# Patient Record
Sex: MALE | Race: WHITE | NOT HISPANIC OR LATINO | Employment: FULL TIME | URBAN - METROPOLITAN AREA
[De-identification: names, ages, dates, MRNs, and addresses within clinical notes are randomized per-mention and may not be internally consistent; named-entity substitution may affect disease eponyms.]

---

## 2021-01-08 ENCOUNTER — OFFICE VISIT (OUTPATIENT)
Dept: URGENT CARE | Facility: CLINIC | Age: 36
End: 2021-01-08
Payer: COMMERCIAL

## 2021-01-08 VITALS
DIASTOLIC BLOOD PRESSURE: 69 MMHG | HEIGHT: 69 IN | SYSTOLIC BLOOD PRESSURE: 136 MMHG | WEIGHT: 213 LBS | RESPIRATION RATE: 20 BRPM | BODY MASS INDEX: 31.55 KG/M2 | HEART RATE: 83 BPM | TEMPERATURE: 97.9 F

## 2021-01-08 DIAGNOSIS — R07.89 RIGHT-SIDED CHEST WALL PAIN: Primary | ICD-10-CM

## 2021-01-08 PROCEDURE — 99213 OFFICE O/P EST LOW 20 MIN: CPT | Performed by: NURSE PRACTITIONER

## 2021-01-09 NOTE — PROGRESS NOTES
[facility] Now        NAME: Qi Romo is a 38 y o  male  : 1985    MRN: [id number]  DATE: 2021  TIME: 7:32 PM    Assessment and Plan   Right-sided chest wall pain [R07 89]  1  Right-sided chest wall pain           Patient Instructions     --Suspect costochondritis (inflammation of cartilage between ribs)  --Advise rest, potentially exacerbating activities such as heavy lifting  --Moist heat  --Motrin/Advil/Aleve or OTC Voltaren gel  An alternative is OTC lidocaine patch    --F/u with PCP if no better/resolved in the next 2 weeks  --Go to ER if pain gets progressively worse and/or you develop shortness of breath    Chief Complaint     Chief Complaint   Patient presents with    Chest Pain     states of having pain in R lower rib area for 2 days, no known injury, worse with inspiration  and with palpation  History of Present Illness         Here with complaints of right sided chest pain x 4 days  Described as "sharp", localized to single area just below nipple  No radiation across chest or elsewhere other than back on occasion  Only felt with deep breath, certain movements  Rates 0/10 at present, 5/10 with deep breath  No associated shortness of breath, cough, fever/chills, rash, N/V, calf pain  No analgesics taken  No ice/heat  No known precipitating injury or activity  Active job, however  Denies PH/FH CAD  Elevated TG only  Review of Systems   Review of Systems   Respiratory: Negative for cough and shortness of breath  Cardiovascular: Positive for chest pain  Negative for palpitations  Gastrointestinal: Negative for nausea and vomiting  Musculoskeletal: Negative for myalgias  Skin: Negative for rash  Psychiatric/Behavioral: Negative for sleep disturbance  Current Medications     No current outpatient medications on file      Current Allergies     Allergies as of 2021    (No Known Allergies)            The following portions of the patient's history were reviewed and updated as appropriate: allergies, current medications, past family history, past medical history, past social history, past surgical history and problem list      No past medical history on file  No past surgical history on file  No family history on file  Medications have been verified  Objective   /69 (Patient Position: Sitting)   Pulse 83   Temp 97 9 °F (36 6 °C) (Temporal)   Resp 20   Ht 5' 9" (1 753 m)   Wt 96 6 kg (213 lb)   BMI 31 45 kg/m²   No LMP for male patient  Physical Exam     Physical Exam  Constitutional:       General: He is not in acute distress  Appearance: He is not ill-appearing, toxic-appearing or diaphoretic  Cardiovascular:      Rate and Rhythm: Normal rate and regular rhythm  Heart sounds: No murmur  Pulmonary:      Effort: Pulmonary effort is normal  No respiratory distress  Breath sounds: Normal breath sounds  No stridor  No wheezing, rhonchi or rales  Comments: Breathing non-labored  RR=16  Area of symptomatology,  localized area of right chest wall approximately 3 cm below nipple, with reproducible tenderness of intercostal space  No palpable or visualized abnormality  Remainder of chest wall nontender with normal appearance  Chest:      Chest wall: Tenderness present  Abdominal:      General: Abdomen is flat  Bowel sounds are normal       Palpations: There is no mass  Tenderness: There is no abdominal tenderness  There is no guarding  Comments: Negative Hernadez  Musculoskeletal:         General: No tenderness  Comments: No calf tenderness  Skin:     Findings: No rash  Neurological:      Mental Status: He is alert     Psychiatric:         Mood and Affect: Mood normal

## 2021-01-09 NOTE — PATIENT INSTRUCTIONS
--Suspect costochondritis (inflammation of cartilage between ribs)  --Advise rest, potentially exacerbating activities such as heavy lifting  --Moist heat  --Motrin/Advil/Aleve or OTC Voltaren gel  An alternative is OTC lidocaine patch    --F/u with PCP if no better/resolved in the next 2 weeks  --Go to ER if pain gets progressively worse and/or you develop shortness of breath    Costochondritis   WHAT YOU NEED TO KNOW:   Costochondritis is a condition that causes pain in the cartilage that connect your ribs to your sternum (breastbone)  Cartilage is the tough, bendable tissue that protects your bones  DISCHARGE INSTRUCTIONS:   Medicines:   · Acetaminophen: This medicine decreases pain  Acetaminophen is available without a doctor's order  Ask how much to take and how often to take it  Follow directions  Acetaminophen can cause liver damage if not taken correctly  · NSAIDs , such as ibuprofen, help decrease swelling, pain, and fever  This medicine is available with or without a doctor's order  NSAIDs can cause stomach bleeding or kidney problems in certain people  If you take blood thinner medicine, always ask if NSAIDs are safe for you  Always read the medicine label and follow directions  Do not give these medicines to children under 10months of age without direction from your child's healthcare provider  · Take your medicine as directed  Contact your healthcare provider if you think your medicine is not helping or if you have side effects  Tell him of her if you are allergic to any medicine  Keep a list of the medicines, vitamins, and herbs you take  Include the amounts, and when and why you take them  Bring the list or the pill bottles to follow-up visits  Carry your medicine list with you in case of an emergency  Follow up with your healthcare provider as directed:  Write down your questions so you remember to ask them during your visits     Rest:  You may need to get more rest  Learn which movements and activities cause pain, and avoid doing them  Do not carry objects, such as a purse or backpack, if this is painful  Avoid activities such as rowing and weightlifting until your pain decreases or goes away  Ask which activities are best for you to do while you recover  Heat:  Heat helps decrease pain in some patients  Apply heat on the area for 20 to 30 minutes every 2 hours for as many days as directed  Ice:  Ice helps decrease swelling and pain  Ice may also help prevent tissue damage  Use an ice pack, or put crushed ice in a plastic bag  Cover it with a towel and place it on the painful area for 15 to 20 minutes every hour or as directed  Stretching exercises:  Gentle stretching may help your symptoms   a doorway and put your hands on the door frame at the level of your ears or shoulders  Take 1 step forward and gently stretch your chest  Try this with your hands higher up on the doorway  Contact your healthcare provider if:   · You have a fever  · The painful areas of your chest look swollen, red, and feel warm to the touch  · You cannot sleep because of the pain  · You have questions or concerns about your condition or care  © Copyright [facility] [address] Information is for End User's use only and may not be sold, redistributed or otherwise used for commercial purposes  All illustrations and images included in CareNotes® are the copyrighted property of [employer]  or Nat Bañuelos  The above information is an  only  It is not intended as medical advice for individual conditions or treatments  Talk to your doctor, nurse or pharmacist before following any medical regimen to see if it is safe and effective for you

## 2021-08-05 ENCOUNTER — OFFICE VISIT (OUTPATIENT)
Dept: URGENT CARE | Facility: CLINIC | Age: 36
End: 2021-08-05
Payer: COMMERCIAL

## 2021-08-05 VITALS
DIASTOLIC BLOOD PRESSURE: 84 MMHG | HEIGHT: 69 IN | OXYGEN SATURATION: 96 % | TEMPERATURE: 96.9 F | RESPIRATION RATE: 16 BRPM | HEART RATE: 91 BPM | SYSTOLIC BLOOD PRESSURE: 144 MMHG | BODY MASS INDEX: 31.4 KG/M2 | WEIGHT: 212 LBS

## 2021-08-05 DIAGNOSIS — B35.4 RINGWORM OF BODY: Primary | ICD-10-CM

## 2021-08-05 PROCEDURE — G0382 LEV 3 HOSP TYPE B ED VISIT: HCPCS | Performed by: PHYSICIAN ASSISTANT

## 2021-08-05 PROCEDURE — S9083 URGENT CARE CENTER GLOBAL: HCPCS | Performed by: PHYSICIAN ASSISTANT

## 2021-08-05 RX ORDER — CLOTRIMAZOLE 1 %
CREAM (GRAM) TOPICAL 2 TIMES DAILY
Qty: 30 G | Refills: 0 | Status: SHIPPED | OUTPATIENT
Start: 2021-08-05

## 2021-08-05 NOTE — PROGRESS NOTES
[facility]        NAME: Wendy Fletcher is a 43 y o  male  : 1985    MRN: [id number]  DATE: 2021  TIME: 5:45 PM    Assessment and Plan   Ringworm of body [B35 4]  1  Ringworm of body  clotrimazole (LOTRIMIN) 1 % cream         Patient Instructions      Begin using prescribed cream to the area   avoid itching as much as possible    Follow-up with primary care physician 3-5 days return for further care if continued symptoms   proceed directly to the ER with any worsening of symptoms  Follow up with PCP in 3-5 days  Proceed to  ER if symptoms worsen  Chief Complaint     Chief Complaint   Patient presents with    Rash     red raised outter area and round pink area inside, area is on neck x this am         History of Present Illness       43year old male  presents the office for chief complaint or red ring on the neck that he was first made aware of today while at the [facility]  Patient has significant tattooing of the neck and arms and did not noticed the rash  Patient was at the [facility] today with the [facility]land mentions something  He does admit to itching at the area for approximately 2 weeks  He denies any drainage, fevers or chills or swelling of the area  Otherwise feels well today  Does have dog at home  no other lesions  Review of Systems   Review of Systems   Constitutional: Negative for chills and fever  HENT: Negative  Respiratory: Negative  Cardiovascular: Negative for chest pain and palpitations  Skin: Positive for color change and rash           Current Medications       Current Outpatient Medications:     clotrimazole (LOTRIMIN) 1 % cream, Apply topically 2 (two) times a day, Disp: 30 g, Rfl: 0    Current Allergies     Allergies as of 2021    (No Known Allergies)            The following portions of the patient's history were reviewed and updated as appropriate: allergies, current medications, past family history, past medical history, past social history, past surgical history and problem list      No past medical history on file  No past surgical history on file  No family history on file  Medications have been verified  Objective   /84   Pulse 91   Temp (!) 96 9 °F (36 1 °C)   Resp 16   Ht 5' 9" (1 753 m)   Wt 96 2 kg (212 lb)   SpO2 96%   BMI 31 31 kg/m²   No LMP for male patient  Physical Exam     Physical Exam  Vitals and nursing note reviewed  Constitutional:       General: He is not in acute distress  Appearance: He is well-developed  He is not ill-appearing or diaphoretic  HENT:      Head: Normocephalic and atraumatic  Right Ear: Hearing and external ear normal       Left Ear: Hearing and external ear normal       Nose: Nose normal  No mucosal edema or rhinorrhea  Mouth/Throat:      Lips: Pink  Mouth: Mucous membranes are moist       Pharynx: Uvula midline  No oropharyngeal exudate, posterior oropharyngeal erythema or uvula swelling  Comments:   No mucous membrane involvement  Eyes:      General: Lids are normal       Conjunctiva/sclera: Conjunctivae normal       Pupils: Pupils are equal, round, and reactive to light  Cardiovascular:      Rate and Rhythm: Normal rate and regular rhythm  Heart sounds: Normal heart sounds, S1 normal and S2 normal  No murmur heard  Pulmonary:      Effort: Pulmonary effort is normal  No respiratory distress  Breath sounds: Normal breath sounds  No stridor  No decreased breath sounds, wheezing or rales  Abdominal:      General: Bowel sounds are normal       Palpations: Abdomen is soft  Tenderness: There is no abdominal tenderness  Musculoskeletal:      Cervical back: Neck supple  Lymphadenopathy:      Cervical: No cervical adenopathy  Skin:     General: Skin is warm and dry  Findings: Rash present  Neurological:      Mental Status: He is alert  Psychiatric:         Behavior: Behavior is cooperative

## 2021-08-05 NOTE — PATIENT INSTRUCTIONS
Begin using prescribed cream to the area   avoid itching as much as possible    Follow-up with primary care physician 3-5 days return for further care if continued symptoms   proceed directly to the ER with any worsening of symptoms      Skin Yeast Infection   WHAT YOU NEED TO KNOW:   Yeast is normally present on the skin  Infection happens when you have too much yeast, or when it gets into a cut on your skin  Certain types of mold and fungus can cause a yeast infection  A skin yeast infection can appear anywhere on your skin or nail beds  Skin yeast infections are usually found on warm, moist parts of the body  Examples include between skin folds or under the breasts  DISCHARGE INSTRUCTIONS:   Return to the emergency department if:   · You have signs of infection, such as pus, warmth or red streaks coming from the wound, or a fever  Call your doctor if:   · Your symptoms worsen or do not get better within 7 to 10 days  · You have new or returning signs of a skin yeast infection after treatment  · You have questions or concerns about your condition or care  Medicines:   · Antifungal medicine  may be given as a cream, ointment, or pill  · Take your medicine as directed  Contact your healthcare provider if you think your medicine is not helping or if you have side effects  Tell him or her if you are allergic to any medicine  Keep a list of the medicines, vitamins, and herbs you take  Include the amounts, and when and why you take them  Bring the list or the pill bottles to follow-up visits  Carry your medicine list with you in case of an emergency  Care for the skin near the infection:  You may only have discolored patches of skin, or areas that are dry and flaking  Care for these skin problems as directed by your healthcare provider  If you have painful skin or an open sore, you will need to protect the skin and prevent damage  You will also need to keep the skin dry as much as possible   Ask your healthcare provider how to care for your skin while the infection clears  The following are general guidelines for caring for painful or open skin:  · Keep the skin clean  Ask your healthcare provider if you should wash with mild soap and water  Do not use soap that contains alcohol  Alcohol can dry and irritate the skin and make symptoms worse  Your baby's healthcare provider may tell you to use diaper cream or ointment when you change his or her diaper  This will protect the skin and prevent moisture from collecting  · Keep the skin dry  Pat the area dry with a towel  Do not rub, because this may irritate the skin  If you have a skin yeast infection between skin folds, lift the top part gently and hold it while you dry between your skin folds  Always dry your feet completely after you swim or bathe, including between your toes  Dry your skin if you are sweating from exercise or exposure to heat  Use a clean towel each time to prevent spreading or continuing the infection  · Keep the skin protected  Ask your healthcare provider if you should cover the area with a bandage or leave it open  Check your skin each day to make sure you do not have new or worsening problems  You may need to have someone check the skin if you cannot see the area easily      Prevent another skin yeast infection:   · Do not share clothing or towels    · Wear shower shoes if you need to use a public shower    · Dry your feet completely after you bathe, and apply antifungal powder or cream as directed    · Put on socks before you get dressed so you do not spread fungus from your feet    · Wear light clothing that allows air to get to your skin    · Manage your weight to prevent skin folds where yeast can collect    · Manage diabetes    · Use antibiotics correctly to prevent antibiotic resistance    · Change your baby's diaper often, and keep the area clean and dry as much as possible    · Use a diaper cream or ointment that contains zinc oxide or dimethicone on your baby's diaper area as directed    Follow up with your doctor as directed:  Write down your questions so you remember to ask them during your visits  © Copyright [employer] 2021 Information is for End User's use only and may not be sold, redistributed or otherwise used for commercial purposes  All illustrations and images included in CareNotes® are the copyrighted property of [employer] M , Inc  or [facility] Shanna Rivero   The above information is an  only  It is not intended as medical advice for individual conditions or treatments  Talk to your doctor, nurse or pharmacist before following any medical regimen to see if it is safe and effective for you

## 2022-07-10 ENCOUNTER — OFFICE VISIT (OUTPATIENT)
Dept: URGENT CARE | Facility: CLINIC | Age: 37
End: 2022-07-10
Payer: COMMERCIAL

## 2022-07-10 VITALS
TEMPERATURE: 97.6 F | SYSTOLIC BLOOD PRESSURE: 116 MMHG | DIASTOLIC BLOOD PRESSURE: 79 MMHG | RESPIRATION RATE: 16 BRPM | HEART RATE: 87 BPM

## 2022-07-10 DIAGNOSIS — L23.4 ALLERGIC CONTACT DERMATITIS DUE TO DYES: Primary | ICD-10-CM

## 2022-07-10 DIAGNOSIS — L03.115 CELLULITIS OF RIGHT LOWER EXTREMITY: ICD-10-CM

## 2022-07-10 PROCEDURE — 99214 OFFICE O/P EST MOD 30 MIN: CPT | Performed by: FAMILY MEDICINE

## 2022-07-10 RX ORDER — PREDNISONE 50 MG/1
50 TABLET ORAL DAILY
Qty: 5 TABLET | Refills: 0 | Status: SHIPPED | OUTPATIENT
Start: 2022-07-10

## 2022-07-10 RX ORDER — CEPHALEXIN 500 MG/1
500 CAPSULE ORAL EVERY 6 HOURS SCHEDULED
Qty: 28 CAPSULE | Refills: 0 | Status: SHIPPED | OUTPATIENT
Start: 2022-07-10 | End: 2022-07-17

## 2022-07-10 NOTE — PROGRESS NOTES
[facility] Now        NAME: Catracho Garland is a 42 y o  male  : 1985    MRN: [id number]  DATE: July 10, 2022  TIME: 11:53 AM    Assessment and Plan   Allergic contact dermatitis due to dyes [L23 4]  1  Allergic contact dermatitis due to dyes  predniSONE 50 mg tablet    cephalexin (KEFLEX) 500 mg capsule   2  Cellulitis of right lower extremity           Patient Instructions     Questionable allergic reaction to white tattoo ink  Leg is swollen, red and streaking  Will treat with oral steroids and Keflex  Follow up with PCP in 3-5 days if no improvement  Proceed to  ER if symptoms worsen  Chief Complaint     Chief Complaint   Patient presents with    Rash     States he had a tatoo placed on R lower leg on   Used a cream which he has used before  Noticed area becoming red and rough and swollen about 5 days ago  History of Present Illness       Allergic Reaction  This is a new problem  The current episode started more than 1 week ago  The problem occurs constantly  The problem has been gradually worsening since onset  Associated with: tattoo ink  Associated symptoms include itching and a rash  Pertinent negatives include no abdominal pain, coughing, eye itching, vomiting or wheezing  Swelling is present on the feet  Past treatments include nothing  There is no history of food allergies  Review of Systems   Review of Systems   Constitutional: Negative for chills and fever  HENT: Negative for congestion and sore throat  Eyes: Negative for discharge and itching  Respiratory: Negative for cough and wheezing  Gastrointestinal: Negative for abdominal pain, nausea and vomiting  Genitourinary: Negative for dysuria and flank pain  Musculoskeletal: Negative for arthralgias, myalgias and neck pain  Skin: Positive for itching and rash  Allergic/Immunologic: Negative for food allergies  Neurological: Negative for light-headedness and headaches     Psychiatric/Behavioral: Negative for agitation  The patient is not nervous/anxious  Current Medications       Current Outpatient Medications:     cephalexin (KEFLEX) 500 mg capsule, Take 1 capsule (500 mg total) by mouth every 6 (six) hours for 7 days, Disp: 28 capsule, Rfl: 0    predniSONE 50 mg tablet, Take 1 tablet (50 mg total) by mouth daily, Disp: 5 tablet, Rfl: 0    clotrimazole (LOTRIMIN) 1 % cream, Apply topically 2 (two) times a day (Patient not taking: Reported on 7/10/2022), Disp: 30 g, Rfl: 0    Current Allergies     Allergies as of 07/10/2022    (No Known Allergies)            The following portions of the patient's history were reviewed and updated as appropriate: allergies, current medications, past family history, past medical history, past social history, past surgical history and problem list      No past medical history on file  No past surgical history on file  No family history on file  Medications have been verified  Objective   /79 (Patient Position: Sitting)   Pulse 87   Temp 97 6 °F (36 4 °C) (Temporal)   Resp 16   No LMP for male patient  Physical Exam     Physical Exam  Vitals reviewed  Constitutional:       General: He is not in acute distress  Appearance: Normal appearance  HENT:      Head: Normocephalic and atraumatic  Eyes:      Extraocular Movements: Extraocular movements intact  Conjunctiva/sclera: Conjunctivae normal    Pulmonary:      Effort: Pulmonary effort is normal  No respiratory distress  Skin:     General: Skin is warm and dry  Findings: Rash (right leg, swelling, erythema and scaling along the medial aspect of right leg) present  Neurological:      General: No focal deficit present  Mental Status: He is alert  Psychiatric:         Mood and Affect: Mood normal          Behavior: Behavior normal          Thought Content:  Thought content normal          Judgment: Judgment normal

## 2024-01-26 ENCOUNTER — OFFICE VISIT (OUTPATIENT)
Dept: URGENT CARE | Facility: CLINIC | Age: 39
End: 2024-01-26
Payer: COMMERCIAL

## 2024-01-26 VITALS
TEMPERATURE: 96.8 F | RESPIRATION RATE: 18 BRPM | SYSTOLIC BLOOD PRESSURE: 145 MMHG | HEART RATE: 115 BPM | HEIGHT: 69 IN | DIASTOLIC BLOOD PRESSURE: 88 MMHG | BODY MASS INDEX: 31.1 KG/M2 | OXYGEN SATURATION: 99 % | WEIGHT: 210 LBS

## 2024-01-26 DIAGNOSIS — R11.0 NAUSEA: Primary | ICD-10-CM

## 2024-01-26 PROCEDURE — 99213 OFFICE O/P EST LOW 20 MIN: CPT | Performed by: NURSE PRACTITIONER

## 2024-01-26 RX ORDER — ONDANSETRON 4 MG/1
4 TABLET, FILM COATED ORAL EVERY 8 HOURS PRN
Qty: 10 TABLET | Refills: 0 | Status: SHIPPED | OUTPATIENT
Start: 2024-01-26

## 2024-01-26 NOTE — PROGRESS NOTES
[facility] Now        NAME: Rj Giraldo is a 38 y.o. male  : 1985    MRN: [id number]  DATE: 2024  TIME: 10:57 AM    Assessment and Plan   Nausea [R11.0]  1. Nausea  ondansetron (ZOFRAN) 4 mg tablet            Patient Instructions       Follow up with PCP in 3-5 days.  Proceed to  ER if symptoms worsen.    Chief Complaint     Chief Complaint   Patient presents with    Fever     Started on wed night, highest fever was 101.           History of Present Illness       Patient is 38 year old male presenting with 2 days of fever, body aches, and nausea. MaxT 101.  Associated with nausea and diarrhea. Denies sore throat, congestion, or ear pain. He took imodium. No other OTC medications.     Fever  Associated symptoms include fatigue, a fever, myalgias and nausea. Pertinent negatives include no abdominal pain, chills, congestion, coughing, headaches, rash, sore throat or vomiting.       Review of Systems   Review of Systems   Constitutional:  Positive for fatigue and fever. Negative for activity change and chills.   HENT:  Negative for congestion, ear pain, rhinorrhea, sinus pain and sore throat.    Respiratory:  Negative for cough.    Gastrointestinal:  Positive for diarrhea and nausea. Negative for abdominal pain and vomiting.   Musculoskeletal:  Positive for myalgias.   Skin:  Negative for rash.   Neurological:  Negative for headaches.         Current Medications       Current Outpatient Medications:     ondansetron (ZOFRAN) 4 mg tablet, Take 1 tablet (4 mg total) by mouth every 8 (eight) hours as needed for nausea or vomiting, Disp: 10 tablet, Rfl: 0    clotrimazole (LOTRIMIN) 1 % cream, Apply topically 2 (two) times a day (Patient not taking: Reported on 7/10/2022), Disp: 30 g, Rfl: 0    predniSONE 50 mg tablet, Take 1 tablet (50 mg total) by mouth daily, Disp: 5 tablet, Rfl: 0    Current Allergies     Allergies as of 2024    (No Known Allergies)            The following portions of  "the patient's history were reviewed and updated as appropriate: allergies, current medications, past family history, past medical history, past social history, past surgical history and problem list.     History reviewed. No pertinent past medical history.    History reviewed. No pertinent surgical history.    History reviewed. No pertinent family history.      Medications have been verified.        Objective   /88   Pulse (!) 115   Temp (!) 96.8 °F (36 °C)   Resp 18   Ht 5' 9\" (1.753 m)   Wt 95.3 kg (210 lb)   SpO2 99%   BMI 31.01 kg/m²        Physical Exam     Physical Exam  Vitals reviewed.   Constitutional:       General: He is awake. He is not in acute distress.     Appearance: Normal appearance. He is normal weight.   HENT:      Head: Normocephalic.      Right Ear: Hearing, tympanic membrane, ear canal and external ear normal.      Left Ear: Hearing, tympanic membrane, ear canal and external ear normal.      Nose: Nose normal.      Mouth/Throat:      Lips: Pink.      Mouth: Mucous membranes are moist.      Pharynx: Oropharynx is clear.   Cardiovascular:      Rate and Rhythm: Regular rhythm. Tachycardia present.      Heart sounds: Normal heart sounds, S1 normal and S2 normal.   Pulmonary:      Effort: Pulmonary effort is normal.      Breath sounds: Normal breath sounds. No decreased breath sounds, wheezing or rhonchi.   Skin:     General: Skin is warm and moist.   Neurological:      General: No focal deficit present.      Mental Status: He is alert and oriented to person, place, and time.   Psychiatric:         Behavior: Behavior is cooperative.                   "

## 2024-05-07 ENCOUNTER — OFFICE VISIT (OUTPATIENT)
Dept: INTERNAL MEDICINE CLINIC | Facility: CLINIC | Age: 39
End: 2024-05-07
Payer: COMMERCIAL

## 2024-05-07 VITALS
DIASTOLIC BLOOD PRESSURE: 84 MMHG | HEIGHT: 69 IN | BODY MASS INDEX: 32.23 KG/M2 | TEMPERATURE: 97.8 F | OXYGEN SATURATION: 96 % | SYSTOLIC BLOOD PRESSURE: 114 MMHG | HEART RATE: 95 BPM | WEIGHT: 217.6 LBS

## 2024-05-07 DIAGNOSIS — K29.60 REFLUX GASTRITIS: ICD-10-CM

## 2024-05-07 DIAGNOSIS — Z00.00 ANNUAL PHYSICAL EXAM: Primary | ICD-10-CM

## 2024-05-07 PROCEDURE — 99203 OFFICE O/P NEW LOW 30 MIN: CPT | Performed by: NURSE PRACTITIONER

## 2024-05-07 PROCEDURE — 99386 PREV VISIT NEW AGE 40-64: CPT | Performed by: NURSE PRACTITIONER

## 2024-05-07 RX ORDER — PANTOPRAZOLE SODIUM 40 MG/1
40 TABLET, DELAYED RELEASE ORAL DAILY
Qty: 30 TABLET | Refills: 0 | Status: SHIPPED | OUTPATIENT
Start: 2024-05-07

## 2024-05-07 NOTE — PROGRESS NOTES
ADULT ANNUAL PHYSICAL  [facility] INTERNAL MEDICINE    NAME: Rj Giraldo  AGE: 38 y.o. SEX: male  : 1985     DATE: 2024     Assessment and Plan:     Problem List Items Addressed This Visit       Reflux gastritis     Discussed a low acid diet.   Advised to take protonix daily for 30 days.   Schedule with GI for EGD.          Relevant Medications    pantoprazole (PROTONIX) 40 mg tablet    Other Relevant Orders    Ambulatory Referral to Gastroenterology     Other Visit Diagnoses       Annual physical exam    -  Primary    Relevant Orders    Comprehensive metabolic panel    CBC and differential    Lipid Panel with Direct LDL reflex    TSH, 3rd generation with Free T4 reflex            Immunizations and preventive care screenings were discussed with patient today. Appropriate education was printed on patient's after visit summary.    Counseling:  Exercise: the importance of regular exercise/physical activity was discussed. Recommend exercise 3-5 times per week for at least 30 minutes.          No follow-ups on file.     Chief Complaint:     Chief Complaint   Patient presents with    SLP Initial Evaluation      History of Present Illness:     Adult Annual Physical   Patient here for a comprehensive physical exam. The patient reports  see below .    Establish care visit.   Ongoing heartburn, all day everyday, some days worse than others.  Taking zantac and tums as needed.   Not worse with certain foods.   Burning up into his throat. No epigastric pain.  Does not smoke, no ETOH use.   Drinks 1 cup of coffee daily.   Does not take nsaids frequently.       Diet and Physical Activity  Diet/Nutrition: well balanced diet.   Exercise: 3-4 times a week on average.      Depression Screening  PHQ-2/9 Depression Screening    Little interest or pleasure in doing things: 0 - not at all  Feeling down, depressed, or hopeless: 0 - not at all  PHQ-2 Score: 0  PHQ-2  Interpretation: Negative depression screen       General Health  Sleep: sleeps well.   Hearing: normal - none .  Vision: goes for regular eye exams and wears glasses.   Dental: regular dental visits.          Review of Systems:     Review of Systems   Constitutional:  Negative for activity change, appetite change, fatigue and unexpected weight change.   Eyes:  Negative for visual disturbance.   Respiratory:  Negative for cough and shortness of breath.    Cardiovascular:  Negative for chest pain, palpitations and leg swelling.   Gastrointestinal:  Negative for abdominal pain, blood in stool, constipation and diarrhea.        Heartburn    Genitourinary:  Negative for difficulty urinating.   Musculoskeletal:  Negative for arthralgias.   Neurological:  Negative for dizziness, weakness, light-headedness and headaches.   Psychiatric/Behavioral:  Negative for sleep disturbance.       Past Medical History:     History reviewed. No pertinent past medical history.   Past Surgical History:     Past Surgical History:   Procedure Laterality Date    HERNIA REPAIR  1985      Family History:     Family History   Problem Relation Age of Onset    Alcohol abuse Neg Hx     Substance Abuse Neg Hx     Mental illness Neg Hx     Depression Neg Hx       Social History:     Social History     Socioeconomic History    Marital status: /Civil Union     Spouse name: None    Number of children: None    Years of education: None    Highest education level: None   Occupational History    None   Tobacco Use    Smoking status: Never    Smokeless tobacco: Never   Vaping Use    Vaping status: Never Used   Substance and Sexual Activity    Alcohol use: Not Currently    Drug use: Never    Sexual activity: Yes     Partners: Female     Birth control/protection: None   Other Topics Concern    None   Social History Narrative    None     Social Determinants of Health     Financial Resource Strain: Not on file   Food Insecurity: Not on file  "  Transportation Needs: Not on file   Physical Activity: Not on file   Stress: Not on file   Social Connections: Not on file   Intimate Partner Violence: Not on file   Housing Stability: Not on file      Current Medications:     Current Outpatient Medications   Medication Sig Dispense Refill    pantoprazole (PROTONIX) 40 mg tablet Take 1 tablet (40 mg total) by mouth daily 30 tablet 0    clotrimazole (LOTRIMIN) 1 % cream Apply topically 2 (two) times a day (Patient not taking: Reported on 7/10/2022) 30 g 0    ondansetron (ZOFRAN) 4 mg tablet Take 1 tablet (4 mg total) by mouth every 8 (eight) hours as needed for nausea or vomiting (Patient not taking: Reported on 5/7/2024) 10 tablet 0    predniSONE 50 mg tablet Take 1 tablet (50 mg total) by mouth daily (Patient not taking: Reported on 5/7/2024) 5 tablet 0     No current facility-administered medications for this visit.      Allergies:     No Known Allergies   Physical Exam:     /84   Pulse 95   Temp 97.8 °F (36.6 °C)   Ht 5' 9\" (1.753 m)   Wt 98.7 kg (217 lb 9.6 oz)   SpO2 96%   BMI 32.13 kg/m²     Physical Exam  Vitals reviewed.   Constitutional:       Appearance: Normal appearance.   HENT:      Head: Normocephalic and atraumatic.      Right Ear: Tympanic membrane, ear canal and external ear normal.      Left Ear: Tympanic membrane, ear canal and external ear normal.   Eyes:      Conjunctiva/sclera: Conjunctivae normal.   Cardiovascular:      Rate and Rhythm: Normal rate and regular rhythm.      Heart sounds: Normal heart sounds.   Pulmonary:      Effort: Pulmonary effort is normal.      Breath sounds: Normal breath sounds.   Abdominal:      General: Bowel sounds are normal.      Palpations: Abdomen is soft.   Musculoskeletal:         General: Normal range of motion.      Cervical back: Neck supple.      Right lower leg: No edema.      Left lower leg: No edema.   Skin:     General: Skin is warm and dry.   Neurological:      Mental Status: He is alert " and oriented to person, place, and time.   Psychiatric:         Mood and Affect: Mood normal.         Behavior: Behavior normal.          BENI Paz  [facility] INTERNAL MEDICINE

## 2024-05-07 NOTE — ASSESSMENT & PLAN NOTE
Discussed a low acid diet.   Advised to take protonix daily for 30 days.   Schedule with GI for EGD.

## 2024-06-03 DIAGNOSIS — K29.60 REFLUX GASTRITIS: ICD-10-CM

## 2024-06-03 RX ORDER — PANTOPRAZOLE SODIUM 40 MG/1
40 TABLET, DELAYED RELEASE ORAL DAILY
Qty: 30 TABLET | Refills: 5 | Status: SHIPPED | OUTPATIENT
Start: 2024-06-03

## 2024-06-07 DIAGNOSIS — K29.60 REFLUX GASTRITIS: ICD-10-CM

## 2024-06-07 RX ORDER — PANTOPRAZOLE SODIUM 40 MG/1
40 TABLET, DELAYED RELEASE ORAL DAILY
Qty: 30 TABLET | Refills: 0 | OUTPATIENT
Start: 2024-06-07

## 2024-08-02 ENCOUNTER — CONSULT (OUTPATIENT)
Dept: GASTROENTEROLOGY | Facility: AMBULARY SURGERY CENTER | Age: 39
End: 2024-08-02
Payer: COMMERCIAL

## 2024-08-02 VITALS
WEIGHT: 217.4 LBS | HEIGHT: 69 IN | SYSTOLIC BLOOD PRESSURE: 122 MMHG | HEART RATE: 91 BPM | BODY MASS INDEX: 32.2 KG/M2 | OXYGEN SATURATION: 99 % | DIASTOLIC BLOOD PRESSURE: 74 MMHG

## 2024-08-02 DIAGNOSIS — E83.119 HEMOCHROMATOSIS, UNSPECIFIED HEMOCHROMATOSIS TYPE: Primary | ICD-10-CM

## 2024-08-02 DIAGNOSIS — K21.9 GASTROESOPHAGEAL REFLUX DISEASE, UNSPECIFIED WHETHER ESOPHAGITIS PRESENT: ICD-10-CM

## 2024-08-02 DIAGNOSIS — R13.10 DYSPHAGIA, UNSPECIFIED TYPE: ICD-10-CM

## 2024-08-02 DIAGNOSIS — K29.60 REFLUX GASTRITIS: ICD-10-CM

## 2024-08-02 PROBLEM — E83.118 OTHER HEMOCHROMATOSIS: Status: ACTIVE | Noted: 2024-08-02

## 2024-08-02 PROCEDURE — 99204 OFFICE O/P NEW MOD 45 MIN: CPT | Performed by: PHYSICIAN ASSISTANT

## 2024-08-02 NOTE — PROGRESS NOTES
[facility] - Outpatient Consultation  Rj Giraldo 39 y.o. male MRN: [id number]  Encounter: [id number]          ASSESSMENT AND PLAN:      39-year-old male with no significant past medical history who presents the office as a new patient for reflux.    Chronic GERD  Dysphagia  Longstanding history of symptoms for over 5 years.  Taking multiple Tums and Zantac daily.  Recently started on Protonix by PCP with some improvement of symptoms.  Dysphagia to solids and liquids mainly in the upper esophagus.  Rule out underlying EOE, severe reflux esophagitis, Schatzki's ring.    -Schedule EGD.  -I obtained informed consent from the patient. The risks/benefits/alternatives of the procedure were discussed with the patient. Risks included, but not limited to, infection, bleeding, perforation. Patient was agreeable.    -Continue Protonix.  Start taking this 30 minutes prior to first meal of the day.  - Can continue Zantac/Pepcid 1-2 times a day as needed.  - Recommend avoiding known food triggers such as based food, fatty food, tomato products, carbonation, citrus.  -Patient is non-smoker.  No alcohol use.    -Further recommendations based on EGD results.    3. ?  Hemochromatosis  Patient reports being told he had this by a sports medicine/possible functional doctor.  He was told to donate blood every 8 weeks which he has been doing for about a year.  He denies ever being turned away.  He has no lab work available in the chart.  No known family history of this.    -Recommend starting with basic labs to check CBC and iron panel.  -Will hold off on gene testing and await CBC and iron panel first.  - Recommend referral to hematology  -Advised patient based on labs will likely plan for abdominal imaging.  Will await for hematology referral  -Discussed hemochromatosis and risk of development to cirrhosis, liver cancer and requires monitoring.      Patient was recommended to follow up after procedure.  Patient was recommended to reach out via [employer] with any questions or concerns in the meantime.   ______________________________________________________________________    HPI:      Rj Giraldo is a 39 y.o. male with no significant past medical history who presents the office as a new patient for reflux.    Patient reports longstanding history of reflux for at least over 5 years.  He reports he would take multiple, daily Tums to control this.  He will try over-the-counter courses of Zantac and Prilosec as well.  His symptoms are mainly with burning in the chest and cough.  He also reports dysphagia with food feeling stuck in the upper esophagus with regurgitation about 1 time a week.    He followed up with his PCP for symptoms 2 months ago and was started on Protonix course.  He does report this seems to be helping and he does not need to use as much Tums.  He is taking this typically after his first meal.    He also does report he was diagnosed with hemochromatosis by a sports medicine provider.  He reports he was told to donate blood every 8 weeks.  He has been doing this for a year now.  He denies any true formal testing.  He has no lab work available in the chart.  He denies any known family history of this.    Patient denies smoking or alcohol use.    No known significant GI family history.    No prior EGD or colonoscopy.    REVIEW OF SYSTEMS:    CONSTITUTIONAL: Denies any fever, chills, rigors, and weight loss.  HEENT: No earache or tinnitus. Denies hearing loss or visual disturbances.  CARDIOVASCULAR: No chest pain or palpitations.   RESPIRATORY: Denies any cough, hemoptysis, shortness of breath or dyspnea on exertion.  GASTROINTESTINAL: As noted in the History of Present Illness.   GENITOURINARY: No problems with urination. Denies any hematuria or dysuria.  NEUROLOGIC: No dizziness or vertigo, denies headaches.   MUSCULOSKELETAL: Denies any muscle or joint pain.   SKIN: Denies skin rashes or itching.  "  ENDOCRINE: Denies excessive thirst. Denies intolerance to heat or cold.  PSYCHOSOCIAL: Denies depression or anxiety. Denies any recent memory loss.       Historical Information   History reviewed. No pertinent past medical history.  Past Surgical History:   Procedure Laterality Date    HERNIA REPAIR  1985     Social History   Social History     Substance and Sexual Activity   Alcohol Use Not Currently     Social History     Substance and Sexual Activity   Drug Use Never     Social History     Tobacco Use   Smoking Status Never   Smokeless Tobacco Never     Family History   Problem Relation Age of Onset    Alcohol abuse Neg Hx     Substance Abuse Neg Hx     Mental illness Neg Hx     Depression Neg Hx        Meds/Allergies       Current Outpatient Medications:     pantoprazole (PROTONIX) 40 mg tablet    clotrimazole (LOTRIMIN) 1 % cream    No Known Allergies        Objective     Blood pressure 122/74, pulse 91, height 5' 9\" (1.753 m), weight 98.6 kg (217 lb 6.4 oz), SpO2 99%. Body mass index is 32.1 kg/m².        PHYSICAL EXAM:      General Appearance:   Alert, cooperative, no distress   HEENT:   Normocephalic, atraumatic, anicteric.     Neck:  Supple, symmetrical, trachea midline   Lungs:   Clear to auscultation bilaterally; no rales, rhonchi or wheezing; respirations unlabored    Heart::   Regular rate and rhythm; no murmur, rub, or gallop.   Abdomen:   Soft, non-tender, non-distended; normal bowel sounds; no masses, no organomegaly. Benign abdomen.    Genitalia:   Deferred    Rectal:   Deferred    Extremities:  No cyanosis, clubbing or edema    Pulses:  2+ and symmetric    Skin:  No jaundice, rashes, or lesions    Lymph nodes:  No palpable cervical lymphadenopathy        Lab Results:   No visits with results within 1 Day(s) from this visit.   Latest known visit with results is:   Generic Conversion - Encounter on 10/26/2015   Component Date Value    Cholesterol 10/26/2015 193     Triglycerides 10/26/2015 141     " HDL 10/26/2015 53     VLDL Cholesterol Yousuf 10/26/2015 28     LDL Calculated 10/26/2015 112 (H)          Radiology Results:   No results found.

## 2024-08-02 NOTE — PATIENT INSTRUCTIONS
Scheduled date of EGD(as of today): Sept. 6, 2024   Physician performing EGD: Dr. Lawton   Location of EGD: [facility]   Instructions reviewed with patient by: JOSÉ MIGUEL   Clearances: N/A

## 2024-08-20 ENCOUNTER — APPOINTMENT (OUTPATIENT)
Dept: LAB | Facility: AMBULARY SURGERY CENTER | Age: 39
End: 2024-08-20
Payer: COMMERCIAL

## 2024-08-20 DIAGNOSIS — E83.119 HEMOCHROMATOSIS, UNSPECIFIED HEMOCHROMATOSIS TYPE: ICD-10-CM

## 2024-08-20 DIAGNOSIS — Z00.00 ANNUAL PHYSICAL EXAM: ICD-10-CM

## 2024-08-20 LAB
ALBUMIN SERPL BCG-MCNC: 4.4 G/DL (ref 3.5–5)
ALP SERPL-CCNC: 45 U/L (ref 34–104)
ALT SERPL W P-5'-P-CCNC: 67 U/L (ref 7–52)
ANION GAP SERPL CALCULATED.3IONS-SCNC: 10 MMOL/L (ref 4–13)
AST SERPL W P-5'-P-CCNC: 37 U/L (ref 13–39)
BASOPHILS # BLD AUTO: 0.04 THOUSANDS/ÂΜL (ref 0–0.1)
BASOPHILS NFR BLD AUTO: 1 % (ref 0–1)
BILIRUB SERPL-MCNC: 0.54 MG/DL (ref 0.2–1)
BUN SERPL-MCNC: 12 MG/DL (ref 5–25)
CALCIUM SERPL-MCNC: 9.3 MG/DL (ref 8.4–10.2)
CHLORIDE SERPL-SCNC: 103 MMOL/L (ref 96–108)
CHOLEST SERPL-MCNC: 192 MG/DL
CO2 SERPL-SCNC: 25 MMOL/L (ref 21–32)
CREAT SERPL-MCNC: 1.24 MG/DL (ref 0.6–1.3)
EOSINOPHIL # BLD AUTO: 0.1 THOUSAND/ÂΜL (ref 0–0.61)
EOSINOPHIL NFR BLD AUTO: 2 % (ref 0–6)
ERYTHROCYTE [DISTWIDTH] IN BLOOD BY AUTOMATED COUNT: 14.1 % (ref 11.6–15.1)
FERRITIN SERPL-MCNC: 8 NG/ML (ref 24–336)
GFR SERPL CREATININE-BSD FRML MDRD: 71 ML/MIN/1.73SQ M
GLUCOSE P FAST SERPL-MCNC: 90 MG/DL (ref 65–99)
HCT VFR BLD AUTO: 47.2 % (ref 36.5–49.3)
HDLC SERPL-MCNC: 40 MG/DL
HGB BLD-MCNC: 14.9 G/DL (ref 12–17)
IMM GRANULOCYTES # BLD AUTO: 0.02 THOUSAND/UL (ref 0–0.2)
IMM GRANULOCYTES NFR BLD AUTO: 0 % (ref 0–2)
IRON SATN MFR SERPL: 14 % (ref 15–50)
IRON SERPL-MCNC: 57 UG/DL (ref 50–212)
LDLC SERPL CALC-MCNC: 125 MG/DL (ref 0–100)
LYMPHOCYTES # BLD AUTO: 1.47 THOUSANDS/ÂΜL (ref 0.6–4.47)
LYMPHOCYTES NFR BLD AUTO: 26 % (ref 14–44)
MCH RBC QN AUTO: 25.3 PG (ref 26.8–34.3)
MCHC RBC AUTO-ENTMCNC: 31.6 G/DL (ref 31.4–37.4)
MCV RBC AUTO: 80 FL (ref 82–98)
MONOCYTES # BLD AUTO: 0.48 THOUSAND/ÂΜL (ref 0.17–1.22)
MONOCYTES NFR BLD AUTO: 9 % (ref 4–12)
NEUTROPHILS # BLD AUTO: 3.48 THOUSANDS/ÂΜL (ref 1.85–7.62)
NEUTS SEG NFR BLD AUTO: 62 % (ref 43–75)
NRBC BLD AUTO-RTO: 0 /100 WBCS
PLATELET # BLD AUTO: 286 THOUSANDS/UL (ref 149–390)
PMV BLD AUTO: 8.8 FL (ref 8.9–12.7)
POTASSIUM SERPL-SCNC: 4.3 MMOL/L (ref 3.5–5.3)
PROT SERPL-MCNC: 6.6 G/DL (ref 6.4–8.4)
RBC # BLD AUTO: 5.9 MILLION/UL (ref 3.88–5.62)
SODIUM SERPL-SCNC: 138 MMOL/L (ref 135–147)
TIBC SERPL-MCNC: 400 UG/DL (ref 250–450)
TRIGL SERPL-MCNC: 137 MG/DL
TSH SERPL DL<=0.05 MIU/L-ACNC: 1.69 UIU/ML (ref 0.45–4.5)
UIBC SERPL-MCNC: 343 UG/DL (ref 155–355)
WBC # BLD AUTO: 5.59 THOUSAND/UL (ref 4.31–10.16)

## 2024-08-20 PROCEDURE — 83540 ASSAY OF IRON: CPT

## 2024-08-20 PROCEDURE — 85025 COMPLETE CBC W/AUTO DIFF WBC: CPT

## 2024-08-20 PROCEDURE — 36415 COLL VENOUS BLD VENIPUNCTURE: CPT

## 2024-08-20 PROCEDURE — 84443 ASSAY THYROID STIM HORMONE: CPT

## 2024-08-20 PROCEDURE — 80053 COMPREHEN METABOLIC PANEL: CPT

## 2024-08-20 PROCEDURE — 83550 IRON BINDING TEST: CPT

## 2024-08-20 PROCEDURE — 82728 ASSAY OF FERRITIN: CPT

## 2024-08-20 PROCEDURE — 80061 LIPID PANEL: CPT

## 2024-08-21 DIAGNOSIS — R74.8 ELEVATED LIVER ENZYMES: Primary | ICD-10-CM

## 2024-08-23 ENCOUNTER — ANESTHESIA EVENT (OUTPATIENT)
Dept: ANESTHESIOLOGY | Facility: HOSPITAL | Age: 39
End: 2024-08-23

## 2024-08-23 ENCOUNTER — ANESTHESIA (OUTPATIENT)
Dept: ANESTHESIOLOGY | Facility: HOSPITAL | Age: 39
End: 2024-08-23

## 2024-08-24 DIAGNOSIS — R74.8 ELEVATED LIVER ENZYMES: Primary | ICD-10-CM

## 2024-09-06 ENCOUNTER — HOSPITAL ENCOUNTER (OUTPATIENT)
Dept: GASTROENTEROLOGY | Facility: AMBULARY SURGERY CENTER | Age: 39
Setting detail: OUTPATIENT SURGERY
End: 2024-09-06
Payer: COMMERCIAL

## 2024-09-06 ENCOUNTER — ANESTHESIA EVENT (OUTPATIENT)
Dept: GASTROENTEROLOGY | Facility: AMBULARY SURGERY CENTER | Age: 39
End: 2024-09-06
Payer: COMMERCIAL

## 2024-09-06 VITALS
SYSTOLIC BLOOD PRESSURE: 125 MMHG | HEIGHT: 69 IN | BODY MASS INDEX: 32.58 KG/M2 | WEIGHT: 220 LBS | HEART RATE: 91 BPM | OXYGEN SATURATION: 98 % | RESPIRATION RATE: 22 BRPM | DIASTOLIC BLOOD PRESSURE: 72 MMHG | TEMPERATURE: 97.3 F

## 2024-09-06 DIAGNOSIS — K21.9 GASTROESOPHAGEAL REFLUX DISEASE, UNSPECIFIED WHETHER ESOPHAGITIS PRESENT: ICD-10-CM

## 2024-09-06 DIAGNOSIS — R13.10 DYSPHAGIA, UNSPECIFIED TYPE: ICD-10-CM

## 2024-09-06 PROCEDURE — 88305 TISSUE EXAM BY PATHOLOGIST: CPT | Performed by: PATHOLOGY

## 2024-09-06 PROCEDURE — 43239 EGD BIOPSY SINGLE/MULTIPLE: CPT | Performed by: INTERNAL MEDICINE

## 2024-09-06 RX ORDER — GLYCOPYRROLATE 0.2 MG/ML
INJECTION INTRAMUSCULAR; INTRAVENOUS AS NEEDED
Status: DISCONTINUED | OUTPATIENT
Start: 2024-09-06 | End: 2024-09-06

## 2024-09-06 RX ORDER — PROPOFOL 10 MG/ML
INJECTION, EMULSION INTRAVENOUS AS NEEDED
Status: DISCONTINUED | OUTPATIENT
Start: 2024-09-06 | End: 2024-09-06

## 2024-09-06 RX ORDER — SODIUM CHLORIDE, SODIUM LACTATE, POTASSIUM CHLORIDE, CALCIUM CHLORIDE 600; 310; 30; 20 MG/100ML; MG/100ML; MG/100ML; MG/100ML
INJECTION, SOLUTION INTRAVENOUS CONTINUOUS PRN
Status: DISCONTINUED | OUTPATIENT
Start: 2024-09-06 | End: 2024-09-06

## 2024-09-06 RX ORDER — LIDOCAINE HYDROCHLORIDE 10 MG/ML
INJECTION, SOLUTION EPIDURAL; INFILTRATION; INTRACAUDAL; PERINEURAL AS NEEDED
Status: DISCONTINUED | OUTPATIENT
Start: 2024-09-06 | End: 2024-09-06

## 2024-09-06 RX ADMIN — SODIUM CHLORIDE, SODIUM LACTATE, POTASSIUM CHLORIDE, AND CALCIUM CHLORIDE: .6; .31; .03; .02 INJECTION, SOLUTION INTRAVENOUS at 11:29

## 2024-09-06 RX ADMIN — LIDOCAINE HYDROCHLORIDE 50 MG: 10 INJECTION, SOLUTION EPIDURAL; INFILTRATION; INTRACAUDAL at 11:38

## 2024-09-06 RX ADMIN — PROPOFOL 150 MG: 10 INJECTION, EMULSION INTRAVENOUS at 11:38

## 2024-09-06 RX ADMIN — PROPOFOL 50 MG: 10 INJECTION, EMULSION INTRAVENOUS at 11:39

## 2024-09-06 RX ADMIN — GLYCOPYRROLATE 0.2 MG: 0.2 INJECTION INTRAMUSCULAR; INTRAVENOUS at 11:36

## 2024-09-06 RX ADMIN — PROPOFOL 50 MG: 10 INJECTION, EMULSION INTRAVENOUS at 11:40

## 2024-09-06 NOTE — H&P
"History and Physical -  [facility]  Rj Giraldo 39 y.o. male MRN: [id number]        HPI: 39-year-old male with history of GERD reports having difficulty swallowing.    Historical Information   Past Medical History:   Diagnosis Date    GERD (gastroesophageal reflux disease)      Past Surgical History:   Procedure Laterality Date    HERNIA REPAIR  1985     Social History   Social History     Substance and Sexual Activity   Alcohol Use Yes    Comment: social     Social History     Substance and Sexual Activity   Drug Use Never     Social History     Tobacco Use   Smoking Status Never   Smokeless Tobacco Never     Family History   Problem Relation Age of Onset    Alcohol abuse Neg Hx     Substance Abuse Neg Hx     Mental illness Neg Hx     Depression Neg Hx        Meds/Allergies     Not in a hospital admission.    No Known Allergies    Objective     Blood pressure 132/82, pulse 90, temperature (!) 97.1 °F (36.2 °C), temperature source Temporal, resp. rate 18, height 5' 9\" (1.753 m), weight 99.8 kg (220 lb), SpO2 96%.    Physical Exam:    Chest- CTA  Heart- RRR  Abdomen- NT/ND  Extremities- No edema    ASSESSMENT:     GERD, dysphagia    PLAN:    EGD              "

## 2024-09-06 NOTE — ANESTHESIA PREPROCEDURE EVALUATION
Procedure:  EGD    Relevant Problems   ANESTHESIA (within normal limits)      CARDIO (within normal limits)      ENDO (within normal limits)      GI/HEPATIC   (+) Gastroesophageal reflux disease      /RENAL (within normal limits)      HEMATOLOGY (within normal limits)      MUSCULOSKELETAL (within normal limits)      NEURO/PSYCH (within normal limits)      PULMONARY (within normal limits)        Physical Exam    Airway    Mallampati score: II  TM Distance: >3 FB  Neck ROM: full     Dental   No notable dental hx     Cardiovascular  Rhythm: regular, Rate: normal, Cardiovascular exam normal    Pulmonary  Pulmonary exam normal Breath sounds clear to auscultation    Other Findings  post-pubertal.      Anesthesia Plan  ASA Score- 2     Anesthesia Type- IV sedation with anesthesia with ASA Monitors.         Additional Monitors:     Airway Plan:            Plan Factors-Exercise tolerance (METS): >4 METS.    Chart reviewed.   Existing labs reviewed. Patient summary reviewed.    Patient is not a current smoker.  Patient instructed to abstain from smoking on day of procedure. Patient did not smoke on day of surgery.            Induction- intravenous.    Postoperative Plan-     Perioperative Resuscitation Plan - Level 1 - Full Code.       Informed Consent- Anesthetic plan and risks discussed with patient.  I personally reviewed this patient with the CRNA. Discussed and agreed on the Anesthesia Plan with the CRNA..

## 2024-09-06 NOTE — ANESTHESIA POSTPROCEDURE EVALUATION
Post-Op Assessment Note    CV Status:  Stable  Pain Score: 0    Pain management: adequate       Mental Status:  Alert and awake   Hydration Status:  Euvolemic   PONV Controlled:  Controlled   Airway Patency:  Patent     Post Op Vitals Reviewed: Yes    No anethesia notable event occurred.    Staff: CRNA               BP   124/76   Temp      Pulse  99   Resp   14   SpO2   99

## 2024-09-07 NOTE — PROGRESS NOTES
HEMATOLOGY / ONCOLOGY CLINIC FOLLOW UP NOTE    Patient Rj Giraldo  MRN: [id number]  : 1985  Date of Encounter 2024      Referring Provider: Marielena Velasquez    Reason for Encounter: Consult for unclear hx hemachromatosis        Oncology History    No history exists.     Assessment / Plan:      Chronic GERD/ Dysphagia  Longstanding history of symptoms for over 5 years.  Taking multiple Tums and Zantac daily.  Recently started on Protonix by PCP with some improvement of symptoms.  Dysphagia to solids and liquids mainly in the upper esophagus.  Rule out underlying EOE, severe reflux esophagitis, Schatzki's ring.     -Schedule EGD.  -Continue Protonix.  Start taking this 30 minutes prior to first meal of the day.  - Can continue Zantac/Pepcid 1-2 times a day as needed.  - Recommend avoiding known food triggers such as based food, fatty food, tomato products, carbonation, citrus.  -Patient is non-smoker.  No alcohol use.     -Further recommendations based on EGD results.     Has questionable history of Hemochromatosis    Sports medicine told him he had this disease and to have phlebotomy with no evidence    Ferritin is very low at 5 with % sat 14 total iron 57 TIBC  400 TSAT low at 14.5%   Hgb 14.9 MCV 80 and thus appears borderline iron deficient    Normal LFTs    TSH/testosterone not done by GI     Will image liver but does not appear c/w hemachromatosis           HPI         Rj Giraldo is a 39 y.o. male with no significant past medical history who presents the office as a new patient for reflux.     Patient reports longstanding history of reflux for at least over 5 years.  He reports he would take multiple, daily Tums to control this.  He will try over-the-counter courses of Zantac and Prilosec as well.  His symptoms are mainly with burning in the chest and cough.  He also reports dysphagia with food feeling stuck in the upper esophagus with regurgitation about 1 time a week.     He followed up  with his PCP for symptoms 2 months ago and was started on Protonix course.  He does report this seems to be helping and he does not need to use as much Tums.  He is taking this typically after his first meal.     He also does report he was diagnosed with hemochromatosis by a sports medicine provider.  He reports he was told to donate blood every 8 weeks.  He has been doing this for a year now.  He denies any true formal testing.  He has no lab work available in the chart.  He denies any known family history of this.     Patient denies smoking or alcohol use.     No known significant GI family history.     No prior EGD or colonoscopy.        History:  Here for diagnosis of hemachromatosis with a ferritin of 8.   Very unclear where this diagnosis came from           Latest Reference Range & Units 08/20/24 08:28   Sodium 135 - 147 mmol/L 138   Potassium 3.5 - 5.3 mmol/L 4.3   Chloride 96 - 108 mmol/L 103   Carbon Dioxide 21 - 32 mmol/L 25   ANION GAP 4 - 13 mmol/L 10   BUN 5 - 25 mg/dL 12   Creatinine 0.60 - 1.30 mg/dL 1.24   GLUCOSE, FASTING 65 - 99 mg/dL 90   Calcium 8.4 - 10.2 mg/dL 9.3   AST 13 - 39 U/L 37   ALT 7 - 52 U/L 67 (H)   ALK PHOS 34 - 104 U/L 45   Total Protein 6.4 - 8.4 g/dL 6.6   Albumin 3.5 - 5.0 g/dL 4.4   Total Bilirubin 0.20 - 1.00 mg/dL 0.54   GFR, Calculated ml/min/1.73sq m 71   Cholesterol See Comment mg/dL 192   Triglycerides See Comment mg/dL 137   HDL >=40 mg/dL 40   LDL Calculated 0 - 100 mg/dL 125 (H)   Iron 50 - 212 ug/dL 57   FERRITIN 24 - 336 ng/mL 8 (L)   Iron Saturation 15 - 50 % 14 (L)   TIBC 250 - 450 ug/dL 400   UIBC 155 - 355 ug/dL 343   WBC 4.31 - 10.16 Thousand/uL 5.59   RBC 3.88 - 5.62 Million/uL 5.90 (H)   Hemoglobin 12.0 - 17.0 g/dL 14.9   Hematocrit 36.5 - 49.3 % 47.2   MCV 82 - 98 fL 80 (L)   MCH 26.8 - 34.3 pg 25.3 (L)   MCHC 31.4 - 37.4 g/dL 31.6   RDW 11.6 - 15.1 % 14.1   Platelet Count 149 - 390 Thousands/uL 286   MPV 8.9 - 12.7 fL 8.8 (L)   nRBC /100 WBCs 0   Segmented  % 43 - 75 % 62   Lymphocytes % 14 - 44 % 26   Monocytes % 4 - 12 % 9   Eosinophils % 0 - 6 % 2   Basophils % 0 - 1 % 1   Immature Grans % 0 - 2 % 0   Absolute Immature Grans 0.00 - 0.20 Thousand/uL 0.02   Absolute Neutrophils 1.85 - 7.62 Thousands/µL 3.48   Absolute Lymphocytes 0.60 - 4.47 Thousands/µL 1.47   Absolute Monocytes 0.17 - 1.22 Thousand/µL 0.48   Absolute Eosinophils 0.00 - 0.61 Thousand/µL 0.10   Absolute Basophils 0.00 - 0.10 Thousands/µL 0.04   (H): Data is abnormally high  (L): Data is abnormally low      REVIEW OF SYSTEMS:  Please note that a 14-point review of systems was performed to include Constitutional, HEENT, Respiratory, CVS, GI, , Musculoskeletal, Integumentary, Neurologic, Rheumatologic, Endocrinologic, Psychiatric, Lymphatic, and Hematologic/Oncologic systems were reviewed and are negative unless otherwise stated in HPI. Positive and negative findings pertinent to this evaluation are incorporated into the history of present illness.      ECOG PS: ***    PROBLEM LIST:  Patient Active Problem List   Diagnosis    Reflux gastritis    Other hemochromatosis    Gastroesophageal reflux disease       Past Medical History:   has a past medical history of GERD (gastroesophageal reflux disease).    PAST SURGICAL HISTORY:   has a past surgical history that includes Hernia repair (1985).    CURRENT MEDICATIONS  Current Outpatient Medications   Medication Sig Dispense Refill    clotrimazole (LOTRIMIN) 1 % cream Apply topically 2 (two) times a day (Patient not taking: Reported on 7/10/2022) 30 g 0    pantoprazole (PROTONIX) 40 mg tablet TAKE 1 TABLET BY MOUTH EVERY DAY 30 tablet 5     No current facility-administered medications for this visit.     [unfilled]    SOCIAL HISTORY:   reports that he has never smoked. He has never used smokeless tobacco. He reports current alcohol use. He reports that he does not use drugs.     FAMILY HISTORY:  family history is not on file.     ALLERGIES:  has No Known  Allergies.      Physical Exam:  Vital Signs:   Visit Vitals  Smoking Status Never     There is no height or weight on file to calculate BMI.  There is no height or weight on file to calculate BSA.    GEN: Alert, awake oriented x3, in no acute distress  HEENT- No pallor, icterus, cyanosis, no oral mucosal lesions,   LAD - no palpable cervical, clavicle, axillary, inguinal LAD  Heart- normal S1 S2, regular rate and rhythm, No murmur, rubs.   Lungs- clear breathing sound bilateral.   Abdomen- soft, Non tender, bowel sounds present  Extremities- No cyanosis, clubbing, edema  Neuro- No focal neurological deficit    Labs:  Lab Results   Component Value Date    WBC 5.59 08/20/2024    HGB 14.9 08/20/2024    HCT 47.2 08/20/2024    MCV 80 (L) 08/20/2024     08/20/2024     Lab Results   Component Value Date    SODIUM 138 08/20/2024    K 4.3 08/20/2024     08/20/2024    CO2 25 08/20/2024    AGAP 10 08/20/2024    BUN 12 08/20/2024    CREATININE 1.24 08/20/2024    GLUF 90 08/20/2024    CALCIUM 9.3 08/20/2024    AST 37 08/20/2024    ALT 67 (H) 08/20/2024    ALKPHOS 45 08/20/2024    TP 6.6 08/20/2024    TBILI 0.54 08/20/2024    EGFR 71 08/20/2024           I spent *** minutes on chart review, face to face counseling time, coordination of care and documentation.    Anna Thomas MD PhD

## 2024-09-09 ENCOUNTER — CONSULT (OUTPATIENT)
Dept: HEMATOLOGY ONCOLOGY | Facility: CLINIC | Age: 39
End: 2024-09-09
Payer: COMMERCIAL

## 2024-09-09 ENCOUNTER — TELEPHONE (OUTPATIENT)
Dept: HEMATOLOGY ONCOLOGY | Facility: CLINIC | Age: 39
End: 2024-09-09

## 2024-09-09 VITALS
WEIGHT: 222.5 LBS | OXYGEN SATURATION: 97 % | DIASTOLIC BLOOD PRESSURE: 66 MMHG | HEART RATE: 90 BPM | RESPIRATION RATE: 18 BRPM | SYSTOLIC BLOOD PRESSURE: 118 MMHG | HEIGHT: 69 IN | BODY MASS INDEX: 32.95 KG/M2 | TEMPERATURE: 98 F

## 2024-09-09 DIAGNOSIS — E83.119 HEMOCHROMATOSIS, UNSPECIFIED HEMOCHROMATOSIS TYPE: ICD-10-CM

## 2024-09-09 DIAGNOSIS — D50.0 IRON DEFICIENCY ANEMIA DUE TO CHRONIC BLOOD LOSS: Primary | ICD-10-CM

## 2024-09-09 PROCEDURE — 99205 OFFICE O/P NEW HI 60 MIN: CPT | Performed by: INTERNAL MEDICINE

## 2024-09-09 RX ORDER — SODIUM CHLORIDE 9 MG/ML
20 INJECTION, SOLUTION INTRAVENOUS ONCE
OUTPATIENT
Start: 2024-09-09

## 2024-09-09 NOTE — PROGRESS NOTES
"  Hematology/Oncology Outpatient Consult Note  Rj Giraldo 39 y.o. male MRN: @ Encounter: [id number]    Date:  9/9/2024    HPI: 39/M sent from gastroenterology office due to self reported history of hemochromatosis. Patient with otherwise no significant past medical history except GERD. He mentions that he works out in the gym a lot and likes to build muscles and one of his gym buddies referred him to this ?doctor outside [place] appx 2 years ago at a clinic named \"[facility]\" to get a full check up and get a full blood test panel ordered to get better idea of his health and more insights into muscle building. Patient mentions that he went there and got a lot of blood tests and the physician called him next day saying he needs to go see him asap because his blood is very thick and he can have a stroke at any time. He was advised to start donating blood to make blood thinner and was told he has hemochromatosis. Patient starting to donate blood every 8 weeks since then and mentions doing that for atleast more than a year.    Patient did have those labs from 2 years ago on his phone which were reviewed today and showed ferritin level in 180s, hemoglobin 19.6, and RBC count 6.60. Otherwise Vitamin levels, cortisol, TSH, CMP, homocysteine was all within normal limits.     Patient recently presented to the gastroenterology office for symptoms of GERD and mentioned about possibility of hemochromatosis there and hence was referred here. He underwent EGD which showed small hiatal hernia and signs of healed ulceration at the GE junction and he was started on protonix for the same. Patient does not mention any known history of hemochromatosis in family. No current issues with fever, chills, nausea, vomiting, abdominal pain, weight loss, weight gain, skin discoloration, night sweats, visions issues.    Assessment / Plan:    Please refer to attending attestation for final recommendations     Polycythemia  Iron " deficiency anemia due to chronic blood loss  Labs as shown by patient from reportedly 2 years ago showed Ferritin in 180s, Hgb 19.6, RBC 6.6  Patient denies smoking or using any supplements including testosterone at that time or now; has never been symptomatic    Labs recently showed Hgb 14.6 and RBC 5.90 despite low ferritin of 8  Likely iatrogenic iron deficiency secondary to serial blood donations which is also masking polycythemia currently    He does have a thick neck with snoring reported by wife as per patient and mentions feeling tired everyday with excessive daytime sleepiness  Other cause high on differential would be testosterone supplementation    Plan  Advised to stop blood donations  Patient agreeable for IV iron infusions in setting of side effects and inconsistent oral iron absorption in setting of concurrent PPI use for GERD - will schedule for 6 weekly Venofer infusions  Referral to sleep medicine to evaluate for VERONIQUE  Repeat labs in 3 months and follow up    - CBC and differential; Future  - Comprehensive metabolic panel; Future  - TIBC Panel (incl. Iron, TIBC, % Iron Saturation); Future  - Ferritin; Future  - LD,Blood; Future  - Magnesium; Future  - Erythropoietin; Future  - Ambulatory Referral to Sleep Medicine; Future      Cancer Staging:  Cancer Staging   No matching staging information was found for the patient.      Previous Hematologic/ Oncologic History:    Oncology History    No history exists.       Test Results:    Imaging: EGD    Result Date: 9/6/2024  Narrative: Table formatting from the original result was not included. [facility] [place] [address] [facility] [address] [phone] DATE OF SERVICE: 9/06/24 PHYSICIAN(S): Attending: Trell Lawton MD Fellow: No Staff Documented INDICATION: Dysphagia, unspecified type, Gastroesophageal reflux disease, unspecified whether esophagitis present POST-OP DIAGNOSIS: See the impression below. PREPROCEDURE:  Informed consent was obtained for the procedure, including sedation.  Risks of perforation, hemorrhage, adverse drug reaction and aspiration were discussed. The patient was placed in the left lateral decubitus position. Patient was explained about the risks and benefits of the procedure. Risks including but not limited to bleeding, infection, and perforation were explained in detail. Also explained about less than 100% sensitivity with the exam and other alternatives. PROCEDURE: EGD DETAILS OF PROCEDURE: Patient was taken to the procedure room where a time out was performed to confirm correct patient and correct procedure. The patient underwent monitored anesthesia care, which was administered by an anesthesia professional. The patient's blood pressure, heart rate, level of consciousness, respirations, oxygen, ECG and ETCO2 were monitored throughout the procedure. The scope was introduced through the mouth and advanced to the second part of the duodenum. Retroflexion was performed in the fundus. The patient experienced no blood loss. The procedure was not difficult. The patient tolerated the procedure well. There were no apparent adverse events. ANESTHESIA INFORMATION: ASA: ASA status not filed in the log. Anesthesia Type: Anesthesia type not filed in the log. MEDICATIONS: No administrations occurring from 1130 to 1145 on 09/06/24 FINDINGS: Esophagus-about 1 cm hiatal hernia was noted and there or changes of healed ulceration at the GE junction.  Esophagus mucosa normal status post random biopsies The stomach and duodenum appeared normal. SPECIMENS: ID Type Source Tests Collected by Time Destination 1 : mid esophagus r/o eosinophilic esophagitis Tissue Esophagus TISSUE EXAM Trell Lawton MD 9/6/2024 11:40 AM      Impression: Esophagus-about 1 cm hiatal hernia was noted and there or changes of healed ulceration at the GE junction.  Esophagus mucosa normal status post random biopsies The stomach and duodenum appeared  normal. RECOMMENDATION: Await pathology results   Trell Lawton MD             Labs:   Lab Results   Component Value Date    WBC 5.59 08/20/2024    HGB 14.9 08/20/2024    HCT 47.2 08/20/2024    MCV 80 (L) 08/20/2024     08/20/2024     Lab Results   Component Value Date    K 4.3 08/20/2024     08/20/2024    CO2 25 08/20/2024    BUN 12 08/20/2024    CREATININE 1.24 08/20/2024    GLUF 90 08/20/2024    CALCIUM 9.3 08/20/2024    AST 37 08/20/2024    ALT 67 (H) 08/20/2024    ALKPHOS 45 08/20/2024    EGFR 71 08/20/2024       ROS: Review of Systems   Constitutional:  Positive for fatigue (during the day with sleepiness; snoring). Negative for chills, fever and unexpected weight change.   HENT:  Negative for ear pain and sore throat.    Eyes:  Negative for pain and visual disturbance.   Respiratory:  Negative for cough and shortness of breath.    Cardiovascular:  Negative for chest pain and palpitations.   Gastrointestinal:  Negative for abdominal pain and vomiting.   Genitourinary:  Negative for dysuria and hematuria.   Musculoskeletal:  Negative for arthralgias and back pain.   Skin:  Negative for color change and rash.   Neurological:  Negative for seizures and syncope.   All other systems reviewed and are negative.      Current Medications: Reviewed  Allergies: Reviewed  PMH/FH/SH:  Reviewed    Physical Exam:    Body surface area is 2.16 meters squared.    Wt Readings from Last 3 Encounters:   09/09/24 101 kg (222 lb 8 oz)   09/06/24 99.8 kg (220 lb)   08/02/24 98.6 kg (217 lb 6.4 oz)        Temp Readings from Last 3 Encounters:   09/09/24 98 °F (36.7 °C) (Temporal)   09/06/24 (!) 97.3 °F (36.3 °C) (Temporal)   05/07/24 97.8 °F (36.6 °C)        BP Readings from Last 3 Encounters:   09/09/24 118/66   09/06/24 125/72   08/02/24 122/74         Pulse Readings from Last 3 Encounters:   09/09/24 90   09/06/24 91   08/02/24 91     @LASTSAO2(3)@    Physical Exam  Constitutional:       Appearance: Normal appearance.    HENT:      Head: Normocephalic and atraumatic.      Mouth/Throat:      Mouth: Mucous membranes are moist.      Pharynx: Oropharynx is clear.   Cardiovascular:      Rate and Rhythm: Normal rate and regular rhythm.      Pulses: Normal pulses.      Heart sounds: Normal heart sounds.   Pulmonary:      Effort: Pulmonary effort is normal. No respiratory distress.      Breath sounds: Normal breath sounds. No wheezing or rales.   Abdominal:      General: Abdomen is flat. There is no distension.      Palpations: Abdomen is soft. There is no mass.      Tenderness: There is no abdominal tenderness.   Musculoskeletal:         General: No swelling or deformity. Normal range of motion.      Right lower leg: No edema.      Left lower leg: No edema.   Skin:     General: Skin is warm.      Capillary Refill: Capillary refill takes less than 2 seconds.      Coloration: Skin is not jaundiced or pale.   Neurological:      General: No focal deficit present.      Mental Status: He is alert and oriented to person, place, and time.   Psychiatric:         Mood and Affect: Mood normal.         Behavior: Behavior normal.         Lam Pereyra MD  PGY-3, Internal Medicine  [facility]

## 2024-09-09 NOTE — PROGRESS NOTES
"  Hematology/Oncology Outpatient Consult Note  Rj Giraldo 39 y.o. male MRN: @ Encounter: [id number]    Date:  9/9/2024    HPI: 39/M sent from gastroenterology office due to self reported history of hemochromatosis. Patient with otherwise no significant past medical history except GERD. He mentions that he works out in the gym a lot and likes to build muscles and one of his gym buddies referred him to this ?doctor outside [place] appx 2 years ago at a clinic named \"[facility]\" to get a full check up and get a full blood test panel ordered to get better idea of his health and more insights into muscle building. Patient mentions that he went there and got a lot of blood tests and the physician called him next day saying he needs to go see him asap because his blood is very thick and he can have a stroke at any time. He was advised to start donating blood to make blood thinner and was told he has hemochromatosis. Patient starting to donate blood every 8 weeks since then and mentions doing that for atleast more than a year.    Patient did have those labs from 2 years ago on his phone which were reviewed today and showed ferritin level in 180s, hemoglobin 19.6, and RBC count 6.60. Otherwise Vitamin levels, cortisol, TSH, CMP, homocysteine was all within normal limits.     Patient recently presented to the gastroenterology office for symptoms of GERD and mentioned about possibility of hemochromatosis there and hence was referred here. He underwent EGD which showed small hiatal hernia and signs of healed ulceration at the GE junction and he was started on protonix for the same. Patient does not mention any known history of hemochromatosis in family. No current issues with fever, chills, nausea, vomiting, abdominal pain, weight loss, weight gain, skin discoloration, night sweats, visions issues.    Assessment / Plan:    Please refer to attending attestation for final recommendations     Polycythemia  Iron " deficiency anemia due to chronic blood loss  Labs as shown by patient from reportedly 2 years ago showed Ferritin in 180s, Hgb 19.6, RBC 6.6  Patient denies smoking or using any supplements including testosterone at that time or now; has never been symptomatic    Labs recently showed Hgb 14.6 and RBC 5.90 despite low ferritin of 8  Likely iatrogenic iron deficiency secondary to serial blood donations which is also masking polycythemia currently    He does have a thick neck with snoring reported by wife as per patient and mentions feeling tired everyday with excessive daytime sleepiness  Other cause high on differential would be testosterone supplementation    Plan  Advised to stop blood donations  Patient agreeable for IV iron infusions in setting of side effects and inconsistent oral iron absorption in setting of concurrent PPI use for GERD - will schedule for 6 weekly Venofer infusions  Referral to sleep medicine to evaluate for VERONIQUE  Repeat labs in 3 months and follow up    - CBC and differential; Future  - Comprehensive metabolic panel; Future  - TIBC Panel (incl. Iron, TIBC, % Iron Saturation); Future  - Ferritin; Future  - LD,Blood; Future  - Magnesium; Future  - Erythropoietin; Future  - Ambulatory Referral to Sleep Medicine; Future      Cancer Staging:  Cancer Staging   No matching staging information was found for the patient.      Previous Hematologic/ Oncologic History:    Oncology History    No history exists.       Test Results:    Imaging: EGD    Result Date: 9/6/2024  Narrative: Table formatting from the original result was not included. [facility] [place] [address] [facility] [address] [phone] DATE OF SERVICE: 9/06/24 PHYSICIAN(S): Attending: Trell Lawton MD Fellow: No Staff Documented INDICATION: Dysphagia, unspecified type, Gastroesophageal reflux disease, unspecified whether esophagitis present POST-OP DIAGNOSIS: See the impression below. PREPROCEDURE:  Informed consent was obtained for the procedure, including sedation.  Risks of perforation, hemorrhage, adverse drug reaction and aspiration were discussed. The patient was placed in the left lateral decubitus position. Patient was explained about the risks and benefits of the procedure. Risks including but not limited to bleeding, infection, and perforation were explained in detail. Also explained about less than 100% sensitivity with the exam and other alternatives. PROCEDURE: EGD DETAILS OF PROCEDURE: Patient was taken to the procedure room where a time out was performed to confirm correct patient and correct procedure. The patient underwent monitored anesthesia care, which was administered by an anesthesia professional. The patient's blood pressure, heart rate, level of consciousness, respirations, oxygen, ECG and ETCO2 were monitored throughout the procedure. The scope was introduced through the mouth and advanced to the second part of the duodenum. Retroflexion was performed in the fundus. The patient experienced no blood loss. The procedure was not difficult. The patient tolerated the procedure well. There were no apparent adverse events. ANESTHESIA INFORMATION: ASA: ASA status not filed in the log. Anesthesia Type: Anesthesia type not filed in the log. MEDICATIONS: No administrations occurring from 1130 to 1145 on 09/06/24 FINDINGS: Esophagus-about 1 cm hiatal hernia was noted and there or changes of healed ulceration at the GE junction.  Esophagus mucosa normal status post random biopsies The stomach and duodenum appeared normal. SPECIMENS: ID Type Source Tests Collected by Time Destination 1 : mid esophagus r/o eosinophilic esophagitis Tissue Esophagus TISSUE EXAM Trell Lawton MD 9/6/2024 11:40 AM      Impression: Esophagus-about 1 cm hiatal hernia was noted and there or changes of healed ulceration at the GE junction.  Esophagus mucosa normal status post random biopsies The stomach and duodenum appeared  normal. RECOMMENDATION: Await pathology results   Trell Lawton MD             Labs:   Lab Results   Component Value Date    WBC 5.59 08/20/2024    HGB 14.9 08/20/2024    HCT 47.2 08/20/2024    MCV 80 (L) 08/20/2024     08/20/2024     Lab Results   Component Value Date    K 4.3 08/20/2024     08/20/2024    CO2 25 08/20/2024    BUN 12 08/20/2024    CREATININE 1.24 08/20/2024    GLUF 90 08/20/2024    CALCIUM 9.3 08/20/2024    AST 37 08/20/2024    ALT 67 (H) 08/20/2024    ALKPHOS 45 08/20/2024    EGFR 71 08/20/2024       ROS: Review of Systems   Constitutional:  Positive for fatigue (during the day with sleepiness; snoring). Negative for chills, fever and unexpected weight change.   HENT:  Negative for ear pain and sore throat.    Eyes:  Negative for pain and visual disturbance.   Respiratory:  Negative for cough and shortness of breath.    Cardiovascular:  Negative for chest pain and palpitations.   Gastrointestinal:  Negative for abdominal pain and vomiting.   Genitourinary:  Negative for dysuria and hematuria.   Musculoskeletal:  Negative for arthralgias and back pain.   Skin:  Negative for color change and rash.   Neurological:  Negative for seizures and syncope.   All other systems reviewed and are negative.      Current Medications: Reviewed  Allergies: Reviewed  PMH/FH/SH:  Reviewed    Physical Exam:    Body surface area is 2.16 meters squared.    Wt Readings from Last 3 Encounters:   09/09/24 101 kg (222 lb 8 oz)   09/06/24 99.8 kg (220 lb)   08/02/24 98.6 kg (217 lb 6.4 oz)        Temp Readings from Last 3 Encounters:   09/09/24 98 °F (36.7 °C) (Temporal)   09/06/24 (!) 97.3 °F (36.3 °C) (Temporal)   05/07/24 97.8 °F (36.6 °C)        BP Readings from Last 3 Encounters:   09/09/24 118/66   09/06/24 125/72   08/02/24 122/74         Pulse Readings from Last 3 Encounters:   09/09/24 90   09/06/24 91   08/02/24 91     @LASTSAO2(3)@    Physical Exam  Constitutional:       Appearance: Normal appearance.    HENT:      Head: Normocephalic and atraumatic.      Mouth/Throat:      Mouth: Mucous membranes are moist.      Pharynx: Oropharynx is clear.   Cardiovascular:      Rate and Rhythm: Normal rate and regular rhythm.      Pulses: Normal pulses.      Heart sounds: Normal heart sounds.   Pulmonary:      Effort: Pulmonary effort is normal. No respiratory distress.      Breath sounds: Normal breath sounds. No wheezing or rales.   Abdominal:      General: Abdomen is flat. There is no distension.      Palpations: Abdomen is soft. There is no mass.      Tenderness: There is no abdominal tenderness.   Musculoskeletal:         General: No swelling or deformity. Normal range of motion.      Right lower leg: No edema.      Left lower leg: No edema.   Skin:     General: Skin is warm.      Capillary Refill: Capillary refill takes less than 2 seconds.      Coloration: Skin is not jaundiced or pale.   Neurological:      General: No focal deficit present.      Mental Status: He is alert and oriented to person, place, and time.   Psychiatric:         Mood and Affect: Mood normal.         Behavior: Behavior normal.         Lam Pereyra MD  PGY-3, Internal Medicine  [facility]

## 2024-09-10 PROCEDURE — 88305 TISSUE EXAM BY PATHOLOGIST: CPT | Performed by: PATHOLOGY

## 2024-09-24 ENCOUNTER — TELEPHONE (OUTPATIENT)
Dept: INFUSION CENTER | Facility: CLINIC | Age: 39
End: 2024-09-24

## 2024-09-24 NOTE — TELEPHONE ENCOUNTER
New Patient Phone Call  New patient phone call completed. Confirmed date and time of appointment. Reviewed visitor policy. All questions answered to patient satisfaction.

## 2024-09-26 ENCOUNTER — HOSPITAL ENCOUNTER (OUTPATIENT)
Dept: INFUSION CENTER | Facility: CLINIC | Age: 39
Discharge: HOME | End: 2024-09-26
Payer: COMMERCIAL

## 2024-09-26 VITALS
DIASTOLIC BLOOD PRESSURE: 91 MMHG | TEMPERATURE: 98 F | HEART RATE: 84 BPM | OXYGEN SATURATION: 98 % | RESPIRATION RATE: 18 BRPM | SYSTOLIC BLOOD PRESSURE: 138 MMHG

## 2024-09-26 DIAGNOSIS — D50.0 IRON DEFICIENCY ANEMIA DUE TO CHRONIC BLOOD LOSS: Primary | ICD-10-CM

## 2024-09-26 PROCEDURE — 96365 THER/PROPH/DIAG IV INF INIT: CPT

## 2024-09-26 RX ORDER — SODIUM CHLORIDE 9 MG/ML
20 INJECTION, SOLUTION INTRAVENOUS ONCE
Status: COMPLETED | OUTPATIENT
Start: 2024-09-26 | End: 2024-09-26

## 2024-09-26 RX ORDER — SODIUM CHLORIDE 9 MG/ML
20 INJECTION, SOLUTION INTRAVENOUS ONCE
OUTPATIENT
Start: 2024-10-03

## 2024-09-26 RX ORDER — SODIUM CHLORIDE 9 MG/ML
20 INJECTION, SOLUTION INTRAVENOUS ONCE
Status: CANCELLED | OUTPATIENT
Start: 2024-10-03

## 2024-09-26 RX ADMIN — SODIUM CHLORIDE 20 ML/HR: 0.9 INJECTION, SOLUTION INTRAVENOUS at 16:02

## 2024-09-26 RX ADMIN — IRON SUCROSE 200 MG: 20 INJECTION, SOLUTION INTRAVENOUS at 16:02

## 2024-09-26 NOTE — PROGRESS NOTES
Patient tolerated infusion without issue. PIV removed L FA by DOMINIQUE RN, bleeding controlled, gauze and coban applied. Confirmed next scheduled appointment 10/4 at 1430, declined AVS, ambulatory from department.

## 2024-09-26 NOTE — PROGRESS NOTES
COPD EDUCATION DONE Patient to infusion center for venofer infusion. Patient offers no complaints. VSS. Peripheral IV inserted without incident.    Mary Ashley  (NP)  2020 04:27:11

## 2024-09-26 NOTE — PROGRESS NOTES
Patient to infusion center for venofer infusion. Patient offers no complaints. VSS. Peripheral IV inserted without incident.

## 2024-09-26 NOTE — PROGRESS NOTES
Patient scheduled for 10 Venofer, only 6 Venofer doses ordered, but 10 appt requests. Per note, patient to receive 6 weekly Venofers. TEAMs message to Dr Thomas's office - treatment plan updated to reflect 6 appt requests. Last 4 of initial 10 Venofer cancelled. #1/6 today, 5 more scheduled.

## 2024-10-05 DIAGNOSIS — K29.60 REFLUX GASTRITIS: ICD-10-CM

## 2024-10-05 RX ORDER — PANTOPRAZOLE SODIUM 40 MG/1
40 TABLET, DELAYED RELEASE ORAL DAILY
Qty: 90 TABLET | Refills: 1 | Status: SHIPPED | OUTPATIENT
Start: 2024-10-05

## 2024-10-10 ENCOUNTER — HOSPITAL ENCOUNTER (OUTPATIENT)
Dept: INFUSION CENTER | Facility: CLINIC | Age: 39
Discharge: HOME | End: 2024-10-10
Payer: COMMERCIAL

## 2024-10-10 VITALS
SYSTOLIC BLOOD PRESSURE: 147 MMHG | RESPIRATION RATE: 18 BRPM | DIASTOLIC BLOOD PRESSURE: 92 MMHG | TEMPERATURE: 97.7 F | OXYGEN SATURATION: 97 % | HEART RATE: 85 BPM

## 2024-10-10 DIAGNOSIS — D50.0 IRON DEFICIENCY ANEMIA DUE TO CHRONIC BLOOD LOSS: Primary | ICD-10-CM

## 2024-10-10 PROCEDURE — 96365 THER/PROPH/DIAG IV INF INIT: CPT

## 2024-10-10 RX ORDER — SODIUM CHLORIDE 9 MG/ML
20 INJECTION, SOLUTION INTRAVENOUS ONCE
Status: COMPLETED | OUTPATIENT
Start: 2024-10-10 | End: 2024-10-10

## 2024-10-10 RX ORDER — SODIUM CHLORIDE 9 MG/ML
20 INJECTION, SOLUTION INTRAVENOUS ONCE
Status: CANCELLED | OUTPATIENT
Start: 2024-10-17

## 2024-10-10 RX ADMIN — IRON SUCROSE 200 MG: 20 INJECTION, SOLUTION INTRAVENOUS at 16:10

## 2024-10-10 RX ADMIN — SODIUM CHLORIDE 20 ML/HR: 0.9 INJECTION, SOLUTION INTRAVENOUS at 16:05

## 2024-10-10 NOTE — PROGRESS NOTES
Patient tolerated treatment without incident. Peripheral IV removed. Next appointment confirmed for 10/17/2024 1600.  AVS offered and declined.

## 2024-10-17 ENCOUNTER — HOSPITAL ENCOUNTER (OUTPATIENT)
Dept: INFUSION CENTER | Facility: CLINIC | Age: 39
Discharge: HOME | End: 2024-10-17
Payer: COMMERCIAL

## 2024-10-17 VITALS
HEART RATE: 88 BPM | DIASTOLIC BLOOD PRESSURE: 86 MMHG | OXYGEN SATURATION: 97 % | SYSTOLIC BLOOD PRESSURE: 134 MMHG | RESPIRATION RATE: 18 BRPM | TEMPERATURE: 97.8 F

## 2024-10-17 DIAGNOSIS — D50.0 IRON DEFICIENCY ANEMIA DUE TO CHRONIC BLOOD LOSS: Primary | ICD-10-CM

## 2024-10-17 PROCEDURE — 96365 THER/PROPH/DIAG IV INF INIT: CPT

## 2024-10-17 RX ORDER — SODIUM CHLORIDE 9 MG/ML
20 INJECTION, SOLUTION INTRAVENOUS ONCE
Status: COMPLETED | OUTPATIENT
Start: 2024-10-17 | End: 2024-10-17

## 2024-10-17 RX ORDER — SODIUM CHLORIDE 9 MG/ML
20 INJECTION, SOLUTION INTRAVENOUS ONCE
Status: CANCELLED | OUTPATIENT
Start: 2024-10-24

## 2024-10-17 RX ADMIN — IRON SUCROSE 200 MG: 20 INJECTION, SOLUTION INTRAVENOUS at 16:12

## 2024-10-17 RX ADMIN — SODIUM CHLORIDE 20 ML/HR: 0.9 INJECTION, SOLUTION INTRAVENOUS at 16:12

## 2024-10-17 NOTE — PROGRESS NOTES
Patient presents to Infusion Center for Venofer. Patient offers no complaints at this time. PIV placed in L arm with positive blood return.

## 2024-10-17 NOTE — PROGRESS NOTES
Patient tolerated Venofer without issue. PIV removed intact, coban wrap in place. Patient confirms next appt 10/24 at 4PM at [facility], declines AVS.

## 2024-10-17 NOTE — PLAN OF CARE
Problem: Knowledge Deficit  Goal: Patient/family/caregiver demonstrates understanding of disease process, treatment plan, medications, and discharge instructions  Description: Complete learning assessment and assess knowledge base.  Interventions:  - Provide teaching at level of understanding  - Provide teaching via preferred learning methods  Outcome: Progressing      100.3 degF

## 2024-10-24 ENCOUNTER — HOSPITAL ENCOUNTER (OUTPATIENT)
Dept: INFUSION CENTER | Facility: CLINIC | Age: 39
Discharge: HOME | End: 2024-10-24
Payer: COMMERCIAL

## 2024-10-24 VITALS
HEART RATE: 81 BPM | SYSTOLIC BLOOD PRESSURE: 120 MMHG | RESPIRATION RATE: 18 BRPM | TEMPERATURE: 97.5 F | OXYGEN SATURATION: 97 % | DIASTOLIC BLOOD PRESSURE: 82 MMHG

## 2024-10-24 DIAGNOSIS — D50.0 IRON DEFICIENCY ANEMIA DUE TO CHRONIC BLOOD LOSS: Primary | ICD-10-CM

## 2024-10-24 PROCEDURE — 96365 THER/PROPH/DIAG IV INF INIT: CPT

## 2024-10-24 RX ORDER — SODIUM CHLORIDE 9 MG/ML
20 INJECTION, SOLUTION INTRAVENOUS ONCE
Status: CANCELLED | OUTPATIENT
Start: 2024-10-31

## 2024-10-24 RX ORDER — SODIUM CHLORIDE 9 MG/ML
20 INJECTION, SOLUTION INTRAVENOUS ONCE
Status: COMPLETED | OUTPATIENT
Start: 2024-10-24 | End: 2024-10-24

## 2024-10-24 RX ADMIN — IRON SUCROSE 200 MG: 20 INJECTION, SOLUTION INTRAVENOUS at 16:01

## 2024-10-24 RX ADMIN — SODIUM CHLORIDE 20 ML/HR: 0.9 INJECTION, SOLUTION INTRAVENOUS at 15:59

## 2024-10-24 NOTE — PROGRESS NOTES
Tolerated infusion without incident: No adverse reactions noted: Verified follow up appt with patient ( 10/31/24 ): AVS offered and declined

## 2024-10-31 ENCOUNTER — HOSPITAL ENCOUNTER (OUTPATIENT)
Dept: INFUSION CENTER | Facility: CLINIC | Age: 39
Discharge: HOME | End: 2024-10-31
Payer: COMMERCIAL

## 2024-10-31 VITALS
OXYGEN SATURATION: 96 % | RESPIRATION RATE: 18 BRPM | SYSTOLIC BLOOD PRESSURE: 126 MMHG | TEMPERATURE: 97.8 F | HEART RATE: 82 BPM | DIASTOLIC BLOOD PRESSURE: 78 MMHG

## 2024-10-31 DIAGNOSIS — D50.0 IRON DEFICIENCY ANEMIA DUE TO CHRONIC BLOOD LOSS: Primary | ICD-10-CM

## 2024-10-31 PROCEDURE — 96365 THER/PROPH/DIAG IV INF INIT: CPT

## 2024-10-31 RX ORDER — SODIUM CHLORIDE 9 MG/ML
20 INJECTION, SOLUTION INTRAVENOUS ONCE
Status: CANCELLED | OUTPATIENT
Start: 2024-11-07

## 2024-10-31 RX ORDER — SODIUM CHLORIDE 9 MG/ML
20 INJECTION, SOLUTION INTRAVENOUS ONCE
Status: COMPLETED | OUTPATIENT
Start: 2024-10-31 | End: 2024-10-31

## 2024-10-31 RX ADMIN — IRON SUCROSE 200 MG: 20 INJECTION, SOLUTION INTRAVENOUS at 11:39

## 2024-10-31 RX ADMIN — SODIUM CHLORIDE 20 ML/HR: 9 INJECTION, SOLUTION INTRAVENOUS at 11:37

## 2024-10-31 NOTE — PROGRESS NOTES
Patient tolerated treatment without incident. Next appt confirmed with patient for 11/7 at 4pm at [place]. AVS declined.

## 2024-10-31 NOTE — PROGRESS NOTES
Patient presents to the Infusion Center for the treatment of Venofer. He offers no concerns at this time. PIV placed in Left forearm with good blood return. He is resting comfortably in the chair, call bell within reach.

## 2024-11-07 ENCOUNTER — HOSPITAL ENCOUNTER (OUTPATIENT)
Dept: INFUSION CENTER | Facility: CLINIC | Age: 39
Discharge: HOME | End: 2024-11-07
Payer: COMMERCIAL

## 2024-11-07 VITALS
DIASTOLIC BLOOD PRESSURE: 80 MMHG | TEMPERATURE: 98.1 F | HEART RATE: 80 BPM | OXYGEN SATURATION: 97 % | RESPIRATION RATE: 18 BRPM | SYSTOLIC BLOOD PRESSURE: 144 MMHG

## 2024-11-07 DIAGNOSIS — D50.0 IRON DEFICIENCY ANEMIA DUE TO CHRONIC BLOOD LOSS: Primary | ICD-10-CM

## 2024-11-07 PROCEDURE — 96365 THER/PROPH/DIAG IV INF INIT: CPT

## 2024-11-07 RX ORDER — SODIUM CHLORIDE 9 MG/ML
20 INJECTION, SOLUTION INTRAVENOUS ONCE
Status: CANCELLED | OUTPATIENT
Start: 2024-11-07

## 2024-11-07 RX ORDER — SODIUM CHLORIDE 9 MG/ML
20 INJECTION, SOLUTION INTRAVENOUS ONCE
Status: COMPLETED | OUTPATIENT
Start: 2024-11-07 | End: 2024-11-07

## 2024-11-07 RX ADMIN — SODIUM CHLORIDE 20 ML/HR: 9 INJECTION, SOLUTION INTRAVENOUS at 16:11

## 2024-11-07 RX ADMIN — IRON SUCROSE 200 MG: 20 INJECTION, SOLUTION INTRAVENOUS at 16:12

## 2024-11-07 NOTE — PROGRESS NOTES
Pt tolerated infusion without complications. No further appointments scheduled with infusion at this time. Pt aware to get labs done before follow up with provider.

## 2024-11-07 NOTE — PROGRESS NOTES
Patient presents to the Infusion Center for the treatment of Venofer. He offers no concerns at this time. PIV placed in his Left forearm. Patient is resting comfortably in the chair, call bell within reach.

## 2024-11-30 ENCOUNTER — APPOINTMENT (OUTPATIENT)
Dept: LAB | Facility: CLINIC | Age: 39
End: 2024-11-30
Payer: COMMERCIAL

## 2024-11-30 ENCOUNTER — RESULTS FOLLOW-UP (OUTPATIENT)
Dept: INTERNAL MEDICINE CLINIC | Facility: CLINIC | Age: 39
End: 2024-11-30

## 2024-11-30 DIAGNOSIS — R74.8 ELEVATED LIVER ENZYMES: ICD-10-CM

## 2024-11-30 DIAGNOSIS — D50.0 IRON DEFICIENCY ANEMIA DUE TO CHRONIC BLOOD LOSS: ICD-10-CM

## 2024-11-30 LAB
ALBUMIN SERPL BCG-MCNC: 4.5 G/DL (ref 3.5–5)
ALP SERPL-CCNC: 44 U/L (ref 34–104)
ALT SERPL W P-5'-P-CCNC: 34 U/L (ref 7–52)
ANION GAP SERPL CALCULATED.3IONS-SCNC: 7 MMOL/L (ref 4–13)
AST SERPL W P-5'-P-CCNC: 21 U/L (ref 13–39)
BASOPHILS # BLD AUTO: 0.05 THOUSANDS/ΜL (ref 0–0.1)
BASOPHILS NFR BLD AUTO: 1 % (ref 0–1)
BILIRUB SERPL-MCNC: 0.59 MG/DL (ref 0.2–1)
BUN SERPL-MCNC: 12 MG/DL (ref 5–25)
CALCIUM SERPL-MCNC: 9.2 MG/DL (ref 8.4–10.2)
CHLORIDE SERPL-SCNC: 106 MMOL/L (ref 96–108)
CO2 SERPL-SCNC: 25 MMOL/L (ref 21–32)
CREAT SERPL-MCNC: 1.29 MG/DL (ref 0.6–1.3)
EOSINOPHIL # BLD AUTO: 0.08 THOUSAND/ΜL (ref 0–0.61)
EOSINOPHIL NFR BLD AUTO: 1 % (ref 0–6)
ERYTHROCYTE [DISTWIDTH] IN BLOOD BY AUTOMATED COUNT: 19 % (ref 11.6–15.1)
FERRITIN SERPL-MCNC: 64 NG/ML (ref 24–336)
GFR SERPL CREATININE-BSD FRML MDRD: 68 ML/MIN/1.73SQ M
GLUCOSE SERPL-MCNC: 92 MG/DL (ref 65–140)
HCT VFR BLD AUTO: 53 % (ref 36.5–49.3)
HGB BLD-MCNC: 17.9 G/DL (ref 12–17)
IMM GRANULOCYTES # BLD AUTO: 0.02 THOUSAND/UL (ref 0–0.2)
IMM GRANULOCYTES NFR BLD AUTO: 0 % (ref 0–2)
IRON SATN MFR SERPL: 41 % (ref 15–50)
IRON SERPL-MCNC: 140 UG/DL (ref 50–212)
LDH SERPL-CCNC: 153 U/L (ref 140–271)
LYMPHOCYTES # BLD AUTO: 1.65 THOUSANDS/ΜL (ref 0.6–4.47)
LYMPHOCYTES NFR BLD AUTO: 23 % (ref 14–44)
MAGNESIUM SERPL-MCNC: 1.9 MG/DL (ref 1.9–2.7)
MCH RBC QN AUTO: 27.8 PG (ref 26.8–34.3)
MCHC RBC AUTO-ENTMCNC: 33.8 G/DL (ref 31.4–37.4)
MCV RBC AUTO: 82 FL (ref 82–98)
MONOCYTES # BLD AUTO: 0.69 THOUSAND/ΜL (ref 0.17–1.22)
MONOCYTES NFR BLD AUTO: 10 % (ref 4–12)
NEUTROPHILS # BLD AUTO: 4.79 THOUSANDS/ΜL (ref 1.85–7.62)
NEUTS SEG NFR BLD AUTO: 65 % (ref 43–75)
NRBC BLD AUTO-RTO: 0 /100 WBCS
PLATELET # BLD AUTO: 249 THOUSANDS/UL (ref 149–390)
PMV BLD AUTO: 8.5 FL (ref 8.9–12.7)
POTASSIUM SERPL-SCNC: 4.8 MMOL/L (ref 3.5–5.3)
PROT SERPL-MCNC: 6.6 G/DL (ref 6.4–8.4)
RBC # BLD AUTO: 6.44 MILLION/UL (ref 3.88–5.62)
SODIUM SERPL-SCNC: 138 MMOL/L (ref 135–147)
TIBC SERPL-MCNC: 339 UG/DL (ref 250–450)
UIBC SERPL-MCNC: 199 UG/DL (ref 155–355)
WBC # BLD AUTO: 7.28 THOUSAND/UL (ref 4.31–10.16)

## 2024-11-30 PROCEDURE — 82728 ASSAY OF FERRITIN: CPT

## 2024-11-30 PROCEDURE — 85025 COMPLETE CBC W/AUTO DIFF WBC: CPT

## 2024-11-30 PROCEDURE — 82668 ASSAY OF ERYTHROPOIETIN: CPT

## 2024-11-30 PROCEDURE — 83550 IRON BINDING TEST: CPT

## 2024-11-30 PROCEDURE — 36415 COLL VENOUS BLD VENIPUNCTURE: CPT

## 2024-11-30 PROCEDURE — 80053 COMPREHEN METABOLIC PANEL: CPT

## 2024-11-30 PROCEDURE — 83615 LACTATE (LD) (LDH) ENZYME: CPT

## 2024-11-30 PROCEDURE — 83735 ASSAY OF MAGNESIUM: CPT

## 2024-11-30 PROCEDURE — 83540 ASSAY OF IRON: CPT

## 2024-12-03 LAB — EPO SERPL-ACNC: 7.8 MIU/ML (ref 2.6–18.5)

## 2024-12-05 NOTE — PROGRESS NOTES
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      Sports medicine MD told patient had hemochromatosis with ferritin 181      Appears Hgb was elevated at 19, had many phlebotomies and is now iron deficient     Elevated Hgb may be from sleep apnea; no other history to suggest secondary except that     Hgb now 14        Will treat underlying iron deficiency     Check Epo     Will hold on ROBBIE/CALR/MPL testing     2024    Completed Venofer 2024    Hgb 2024 17.9 MCV 82 WBC 7.3 plts 249    Weight remains 222 with BMI 33 which may be contributing    Also has not had phlebotomy since completing iron about one month ago    Will test for ROBBIE/MPL/CALR as well as reticulocyte    Epo was normal 7.8 thus less likely primary     Feels more energetic    Will repeat in 2 months      Iron deficiency anemia due to chronic blood loss     Clear iron deficiency with ferritin 8     Will treat with Venofer; on Protonix which will impede iron absorption     Repeat labs in 3 months after 6 weekly doses of Venofer        2024    S/p Venofer x 6 weekly cycles, completed 2024    Labs 2024    Total iron 140 (57) ferritin 64 (8) % sat 41 (14)    Hgb 14.9 HCT 53, MCV 82     Repeat in 2 months; generally do levels 6-8 weeks post Venofer, slightly early       Follow up     2 months    Labs one week prior; will do ROBBIE and reticulocytes with iron /blood work          HPI: 39/M sent from gastroenterology office due to self reported history of hemochromatosis. Patient with otherwise no significant past medical history except GERD. He mentions that he works out in the gym a lot and likes to build muscles and one of his gym buddies referred him to this ?doctor outside [place] appx 2 years ago at a clinic named \"[facility]\" to get a full check up and get a full blood test panel ordered to get better idea of his health and more insights into muscle building. Patient mentions that he went there and got a lot of blood tests and the physician called him next day saying he needs to go see him asap because his blood is very thick and he can have a stroke at any time. He was advised to start donating blood to make blood thinner and was told he has hemochromatosis. Patient starting to donate blood every 8 weeks since then and mentions doing that for atleast more than a year.     Patient did have those labs from 2 years ago on his phone which were reviewed today and showed ferritin level in 180s, hemoglobin 19.6, and RBC count 6.60. Otherwise Vitamin levels, cortisol, TSH, CMP, homocysteine was all within normal limits.      Patient recently presented to the gastroenterology office for symptoms of GERD and mentioned about possibility of hemochromatosis there and hence was referred here. He underwent EGD which showed small hiatal hernia and signs of healed ulceration at the GE junction and he was started on protonix for the same. Patient does not mention any known history of hemochromatosis in family. No current issues with fever, chills, nausea, vomiting, abdominal pain, weight loss, weight gain, skin discoloration, night sweats, visions issues.      Interval History: 12/9/2024    Doing well; feels improved with recent iron infusions.  Hgb 17.9 and has not donated blood.  Last Hgb 14.9 in August 2024.  WBC 7.3 plts 249, no reticu EPO 7.8 thus normal.  Iron stores repleted as above.      Na 138 K 4.8 Cr 1.29 Ca 9.2 ALT/AST 34/21     May have secondary erythocytosis as suspected due to elevated BMI    No breathing issues, no LAU/SOB          REVIEW OF SYSTEMS: as above   Please note that a 14-point review of systems was performed to include Constitutional, HEENT, Respiratory, CVS, GI, , Musculoskeletal, Integumentary, Neurologic, " Rheumatologic, Endocrinologic, Psychiatric, Lymphatic, and Hematologic/Oncologic systems were reviewed and are negative unless otherwise stated in HPI. Positive and negative findings pertinent to this evaluation are incorporated into the history of present illness.      ECOG PS: 0        No colonoscopy done      Imaging: EGD     Result Date: 9/6/2024  Narrative: Table formatting from the original result was not included. [facility] [place] [address] [facility] [address] [phone] DATE OF SERVICE: 9/06/24 PHYSICIAN(S): Attending: Trell Lawton MD Fellow: No Staff Documented INDICATION: Dysphagia, unspecified type, Gastroesophageal reflux disease, unspecified whether esophagitis present POST-OP DIAGNOSIS: See the impression below. PREPROCEDURE: Informed consent was obtained for the procedure, including sedation.  Risks of perforation, hemorrhage, adverse drug reaction and aspiration were discussed. The patient was placed in the left lateral decubitus position. Patient was explained about the risks and benefits of the procedure. Risks including but not limited to bleeding, infection, and perforation were explained in detail. Also explained about less than 100% sensitivity with the exam and other alternatives. PROCEDURE: EGD DETAILS OF PROCEDURE: Patient was taken to the procedure room where a time out was performed to confirm correct patient and correct procedure. The patient underwent monitored anesthesia care, which was administered by an anesthesia professional. The patient's blood pressure, heart rate, level of consciousness, respirations, oxygen, ECG and ETCO2 were monitored throughout the procedure. The scope was introduced through the mouth and advanced to the second part of the duodenum. Retroflexion was performed in the fundus. The patient experienced no blood loss. The procedure was not difficult. The patient tolerated the procedure well. There were no apparent adverse  events. ANESTHESIA INFORMATION: ASA: ASA status not filed in the log. Anesthesia Type: Anesthesia type not filed in the log. MEDICATIONS: No administrations occurring from 1130 to 1145 on 09/06/24 FINDINGS: Esophagus-about 1 cm hiatal hernia was noted and there or changes of healed ulceration at the GE junction.  Esophagus mucosa normal status post random biopsies The stomach and duodenum appeared normal. SPECIMENS: ID Type Source Tests Collected by Time Destination 1 : mid esophagus r/o eosinophilic esophagitis Tissue Esophagus TISSUE EXAM Trell Lawton MD 9/6/2024 11:40 AM       Impression: Esophagus-about 1 cm hiatal hernia was noted and there or changes of healed ulceration at the GE junction.  Esophagus mucosa normal status post random biopsies The stomach and duodenum appeared normal. RECOMMENDATION: Await pathology results   Trell Lawton MD               PROBLEM LIST:  Patient Active Problem List   Diagnosis    Reflux gastritis    Gastroesophageal reflux disease    Iron deficiency anemia due to chronic blood loss       Past Medical History:   has a past medical history of GERD (gastroesophageal reflux disease).    PAST SURGICAL HISTORY:   has a past surgical history that includes Hernia repair (1985).    CURRENT MEDICATIONS  Current Outpatient Medications   Medication Sig Dispense Refill    clotrimazole (LOTRIMIN) 1 % cream Apply topically 2 (two) times a day (Patient not taking: Reported on 7/10/2022) 30 g 0    pantoprazole (PROTONIX) 40 mg tablet TAKE 1 TABLET BY MOUTH EVERY DAY 90 tablet 1     No current facility-administered medications for this visit.     [unfilled]    SOCIAL HISTORY:   reports that he has never smoked. He has never used smokeless tobacco. He reports current alcohol use. He reports that he does not use drugs.     FAMILY HISTORY:  family history is not on file.     ALLERGIES:  has no known allergies.      Physical Exam:  Vital Signs:   Visit Vitals  Smoking Status Never     There is no  height or weight on file to calculate BMI.  There is no height or weight on file to calculate BSA.    GEN: Alert, awake oriented x3, in no acute distress  HEENT- No pallor, icterus, cyanosis, no oral mucosal lesions,   LAD - no palpable cervical, clavicle, axillary, inguinal LAD  Heart- normal S1 S2, regular rate and rhythm, No murmur, rubs.   Lungs- clear breathing sound bilateral.   Abdomen- soft, Non tender, bowel sounds present  Extremities- No cyanosis, clubbing, edema  Neuro- No focal neurological deficit    Labs:  Lab Results   Component Value Date    WBC 7.28 11/30/2024    HGB 17.9 (H) 11/30/2024    HCT 53.0 (H) 11/30/2024    MCV 82 11/30/2024     11/30/2024     Lab Results   Component Value Date    SODIUM 138 11/30/2024    K 4.8 11/30/2024     11/30/2024    CO2 25 11/30/2024    AGAP 7 11/30/2024    BUN 12 11/30/2024    CREATININE 1.29 11/30/2024    GLUC 92 11/30/2024    GLUF 90 08/20/2024    CALCIUM 9.2 11/30/2024    AST 21 11/30/2024    ALT 34 11/30/2024    ALKPHOS 44 11/30/2024    TP 6.6 11/30/2024    TBILI 0.59 11/30/2024    EGFR 68 11/30/2024           I spent 30 minutes on chart review, face to face counseling time, coordination of care and documentation.    Anna Thomas MD PhD

## 2024-12-09 ENCOUNTER — OFFICE VISIT (OUTPATIENT)
Dept: HEMATOLOGY ONCOLOGY | Facility: CLINIC | Age: 39
End: 2024-12-09
Payer: COMMERCIAL

## 2024-12-09 VITALS
WEIGHT: 222.5 LBS | OXYGEN SATURATION: 96 % | HEIGHT: 69 IN | HEART RATE: 98 BPM | TEMPERATURE: 98 F | SYSTOLIC BLOOD PRESSURE: 144 MMHG | BODY MASS INDEX: 32.95 KG/M2 | RESPIRATION RATE: 17 BRPM | DIASTOLIC BLOOD PRESSURE: 90 MMHG

## 2024-12-09 DIAGNOSIS — D75.1 ERYTHROCYTOSIS: ICD-10-CM

## 2024-12-09 DIAGNOSIS — D50.0 IRON DEFICIENCY ANEMIA DUE TO CHRONIC BLOOD LOSS: Primary | ICD-10-CM

## 2024-12-09 PROCEDURE — 99214 OFFICE O/P EST MOD 30 MIN: CPT | Performed by: INTERNAL MEDICINE

## 2025-02-07 ENCOUNTER — APPOINTMENT (OUTPATIENT)
Dept: LAB | Facility: CLINIC | Age: 40
End: 2025-02-07
Payer: COMMERCIAL

## 2025-02-07 DIAGNOSIS — D50.0 IRON DEFICIENCY ANEMIA DUE TO CHRONIC BLOOD LOSS: ICD-10-CM

## 2025-02-07 LAB
ALBUMIN SERPL BCG-MCNC: 4.4 G/DL (ref 3.5–5)
ALP SERPL-CCNC: 49 U/L (ref 34–104)
ALT SERPL W P-5'-P-CCNC: 35 U/L (ref 7–52)
ANION GAP SERPL CALCULATED.3IONS-SCNC: 10 MMOL/L (ref 4–13)
AST SERPL W P-5'-P-CCNC: 24 U/L (ref 13–39)
BASOPHILS # BLD AUTO: 0.04 THOUSANDS/ΜL (ref 0–0.1)
BASOPHILS NFR BLD AUTO: 1 % (ref 0–1)
BILIRUB SERPL-MCNC: 0.42 MG/DL (ref 0.2–1)
BUN SERPL-MCNC: 13 MG/DL (ref 5–25)
CALCIUM SERPL-MCNC: 9.4 MG/DL (ref 8.4–10.2)
CHLORIDE SERPL-SCNC: 103 MMOL/L (ref 96–108)
CO2 SERPL-SCNC: 26 MMOL/L (ref 21–32)
CREAT SERPL-MCNC: 1.28 MG/DL (ref 0.6–1.3)
EOSINOPHIL # BLD AUTO: 0.11 THOUSAND/ΜL (ref 0–0.61)
EOSINOPHIL NFR BLD AUTO: 2 % (ref 0–6)
ERYTHROCYTE [DISTWIDTH] IN BLOOD BY AUTOMATED COUNT: 13.2 % (ref 11.6–15.1)
FERRITIN SERPL-MCNC: 77 NG/ML (ref 24–336)
GFR SERPL CREATININE-BSD FRML MDRD: 69 ML/MIN/1.73SQ M
GLUCOSE P FAST SERPL-MCNC: 86 MG/DL (ref 65–99)
HCT VFR BLD AUTO: 53.2 % (ref 36.5–49.3)
HGB BLD-MCNC: 18.1 G/DL (ref 12–17)
IMM GRANULOCYTES # BLD AUTO: 0.04 THOUSAND/UL (ref 0–0.2)
IMM GRANULOCYTES NFR BLD AUTO: 1 % (ref 0–2)
IRON SATN MFR SERPL: 26 % (ref 15–50)
IRON SERPL-MCNC: 81 UG/DL (ref 50–212)
LYMPHOCYTES # BLD AUTO: 1.44 THOUSANDS/ΜL (ref 0.6–4.47)
LYMPHOCYTES NFR BLD AUTO: 26 % (ref 14–44)
MCH RBC QN AUTO: 29.9 PG (ref 26.8–34.3)
MCHC RBC AUTO-ENTMCNC: 34 G/DL (ref 31.4–37.4)
MCV RBC AUTO: 88 FL (ref 82–98)
MONOCYTES # BLD AUTO: 0.45 THOUSAND/ΜL (ref 0.17–1.22)
MONOCYTES NFR BLD AUTO: 8 % (ref 4–12)
NEUTROPHILS # BLD AUTO: 3.43 THOUSANDS/ΜL (ref 1.85–7.62)
NEUTS SEG NFR BLD AUTO: 62 % (ref 43–75)
NRBC BLD AUTO-RTO: 0 /100 WBCS
PLATELET # BLD AUTO: 316 THOUSANDS/UL (ref 149–390)
PMV BLD AUTO: 8.4 FL (ref 8.9–12.7)
POTASSIUM SERPL-SCNC: 4.5 MMOL/L (ref 3.5–5.3)
PROT SERPL-MCNC: 6.9 G/DL (ref 6.4–8.4)
RBC # BLD AUTO: 6.06 MILLION/UL (ref 3.88–5.62)
RETICS # AUTO: NORMAL 10*3/UL (ref 14356–105094)
RETICS # CALC: 1.14 % (ref 0.37–1.87)
SODIUM SERPL-SCNC: 139 MMOL/L (ref 135–147)
TIBC SERPL-MCNC: 313.6 UG/DL (ref 250–450)
TRANSFERRIN SERPL-MCNC: 224 MG/DL (ref 203–362)
UIBC SERPL-MCNC: 233 UG/DL (ref 155–355)
WBC # BLD AUTO: 5.51 THOUSAND/UL (ref 4.31–10.16)

## 2025-02-07 PROCEDURE — 83540 ASSAY OF IRON: CPT

## 2025-02-07 PROCEDURE — 82728 ASSAY OF FERRITIN: CPT

## 2025-02-07 PROCEDURE — 85025 COMPLETE CBC W/AUTO DIFF WBC: CPT

## 2025-02-07 PROCEDURE — 80053 COMPREHEN METABOLIC PANEL: CPT

## 2025-02-07 PROCEDURE — 36415 COLL VENOUS BLD VENIPUNCTURE: CPT

## 2025-02-07 PROCEDURE — 83550 IRON BINDING TEST: CPT

## 2025-02-07 PROCEDURE — 85045 AUTOMATED RETICULOCYTE COUNT: CPT

## 2025-02-08 NOTE — PROGRESS NOTES
Name: Rj Giraldo      : 1985      MRN: [id number]  Encounter Provider: Anna Thomas MD  Encounter Date: 2/10/2025   Encounter department: 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      Sports medicine MD told patient had hemochromatosis with ferritin 181      Appears Hgb was elevated at 19, had many phlebotomies and is now iron deficient     Elevated Hgb may be from sleep apnea; no other history to suggest secondary except that     Hgb now 14        Will treat underlying iron deficiency    2/10/2025    Repeat labs post iron in 2024 with WBC 5.5 Hgb 18.1 MCV 88 plts 316    Last Hgb 2024 was 17.9    Is having sleep study; this appears consistent secondary     However, will do ROBBIE testing-was not run last visit           Hemochromatosis, unspecified hemochromatosis type  No evidence hemochromatosis   Ferritin 77       Iron deficiency anemia due to chronic blood loss      Completed Venofer 2024     Hgb 2024 17.9 MCV 82 WBC 7.3 plts 249     Weight remains 222 with BMI 33 which may be contributing     Also has not had phlebotomy since completing iron about one month ago     Will test for ROBBIE/MPL/CALR as well as reticulocyte     Epo was normal 7.8 thus less likely primary      Feels more energetic    2/10/2025    Total iron post Venofer completed 2024 was 81 with ferritin 77 and % sast 26    Hgb 18.1 MCV 88    Summary/Recommendations    Continue follow iron panel    Continue follow counts    ROBBIE testing    Follow up mid 2025     History of Present Illness   No chief complaint on file.     HPI: 39/M sent from gastroenterology office due to self reported history of hemochromatosis. Patient with otherwise no significant past medical history except GERD. He mentions that he works out in the gym a lot and likes to build muscles and one of his gym buddies referred him to this ?doctor outside  [place] appx 2 years ago at a clinic named \"? [facility]\" to get a full check up and get a full blood test panel ordered to get better idea of his health and more insights into muscle building. Patient mentions that he went there and got a lot of blood tests and the physician called him next day saying he needs to go see him asap because his blood is very thick and he can have a stroke at any time. He was advised to start donating blood to make blood thinner and was told he has hemochromatosis. Patient starting to donate blood every 8 weeks since then and mentions doing that for atleast more than a year.     Patient did have those labs from 2 years ago on his phone which were reviewed today and showed ferritin level in 180s, hemoglobin 19.6, and RBC count 6.60. Otherwise Vitamin levels, cortisol, TSH, CMP, homocysteine was all within normal limits.      Patient recently presented to the gastroenterology office for symptoms of GERD and mentioned about possibility of hemochromatosis there and hence was referred here. He underwent EGD which showed small hiatal hernia and signs of healed ulceration at the GE junction and he was started on protonix for the same. Patient does not mention any known history of hemochromatosis in family. No current issues with fever, chills, nausea, vomiting, abdominal pain, weight loss, weight gain, skin discoloration, night sweats, visions issues.        Interval History: 12/9/2024     Doing well; feels improved with recent iron infusions.  Hgb 17.9 and has not donated blood.  Last Hgb 14.9 in August 2024.  WBC 7.3 plts 249, no reticu EPO 7.8 thus normal.  Iron stores repleted as above.       Na 138 K 4.8 Cr 1.29 Ca 9.2 ALT/AST 34/21      May have secondary erythocytosis as suspected due to elevated BMI     No breathing issues, no LAU/SOB      Interval History 2/20/2025    Doing well; no issues.  Feels less tired.  Suspect secondary erythrocytosis based on sleep apnea " as well as BMI    Will have sleep study in the coming months; was scheduled but cancelled due to conflict      Pertinent Medical History   02/08/25:     Review of Systems   Constitutional: Negative.            Objective   There were no vitals taken for this visit.    Pain Screening:     ECOG   0  Physical Exam  Vitals reviewed.   Constitutional:       Appearance: Normal appearance.   HENT:      Head: Normocephalic.      Nose: Nose normal.      Mouth/Throat:      Mouth: Mucous membranes are moist.   Eyes:      Pupils: Pupils are equal, round, and reactive to light.   Cardiovascular:      Rate and Rhythm: Normal rate.      Pulses: Normal pulses.   Pulmonary:      Effort: Pulmonary effort is normal.   Abdominal:      General: Bowel sounds are normal.   Musculoskeletal:         General: Normal range of motion.      Cervical back: Normal range of motion.   Skin:     General: Skin is warm.   Neurological:      General: No focal deficit present.      Mental Status: He is alert.   Psychiatric:         Mood and Affect: Mood normal.         Labs: I have reviewed the following labs:  Lab Results   Component Value Date/Time    WBC 5.51 02/07/2025 07:24 AM    RBC 6.06 (H) 02/07/2025 07:24 AM    Hemoglobin 18.1 (H) 02/07/2025 07:24 AM    Hematocrit 53.2 (H) 02/07/2025 07:24 AM    MCV 88 02/07/2025 07:24 AM    MCH 29.9 02/07/2025 07:24 AM    RDW 13.2 02/07/2025 07:24 AM    Platelets 316 02/07/2025 07:24 AM    Segmented % 62 02/07/2025 07:24 AM    Lymphocytes % 26 02/07/2025 07:24 AM    Monocytes % 8 02/07/2025 07:24 AM    Eosinophils Relative 2 02/07/2025 07:24 AM    Basophils Relative 1 02/07/2025 07:24 AM    Immature Grans % 1 02/07/2025 07:24 AM    Absolute Neutrophils 3.43 02/07/2025 07:24 AM     Lab Results   Component Value Date/Time    Potassium 4.5 02/07/2025 07:24 AM    Chloride 103 02/07/2025 07:24 AM    CO2 26 02/07/2025 07:24 AM    BUN 13 02/07/2025 07:24 AM    Creatinine 1.28 02/07/2025 07:24 AM    Glucose, Fasting 86  02/07/2025 07:24 AM    Calcium 9.4 02/07/2025 07:24 AM    AST 24 02/07/2025 07:24 AM    ALT 35 02/07/2025 07:24 AM    Alkaline Phosphatase 49 02/07/2025 07:24 AM    Total Protein 6.9 02/07/2025 07:24 AM    Albumin 4.4 02/07/2025 07:24 AM    Total Bilirubin 0.42 02/07/2025 07:24 AM    eGFR 69 02/07/2025 07:24 AM             Administrative Statements   I have spent a total time of 30 minutes in caring for this patient on the day of the visit/encounter including Impressions, Counseling / Coordination of care, Documenting in the medical record, Reviewing / ordering tests, medicine, procedures  , and Obtaining or reviewing history  .

## 2025-02-08 NOTE — ASSESSMENT & PLAN NOTE
Completed Venofer 4 Nov 2024     Hgb 11/30/2024 17.9 MCV 82 WBC 7.3 plts 249     Weight remains 222 with BMI 33 which may be contributing     Also has not had phlebotomy since completing iron about one month ago     Will test for ROBBIE/MPL/CALR as well as reticulocyte     Epo was normal 7.8 thus less likely primary      Feels more energetic    2/10/2025    Total iron post Venofer completed Nov 2024 was 81 with ferritin 77 and % sast 26    Hgb 18.1 MCV 88

## 2025-02-10 ENCOUNTER — OFFICE VISIT (OUTPATIENT)
Dept: HEMATOLOGY ONCOLOGY | Facility: CLINIC | Age: 40
End: 2025-02-10
Payer: COMMERCIAL

## 2025-02-10 VITALS
TEMPERATURE: 98 F | BODY MASS INDEX: 32.14 KG/M2 | OXYGEN SATURATION: 98 % | RESPIRATION RATE: 18 BRPM | SYSTOLIC BLOOD PRESSURE: 126 MMHG | HEIGHT: 69 IN | WEIGHT: 217 LBS | DIASTOLIC BLOOD PRESSURE: 68 MMHG | HEART RATE: 88 BPM

## 2025-02-10 DIAGNOSIS — D50.0 IRON DEFICIENCY ANEMIA DUE TO CHRONIC BLOOD LOSS: ICD-10-CM

## 2025-02-10 DIAGNOSIS — D75.1 ERYTHROCYTOSIS: Primary | ICD-10-CM

## 2025-02-10 DIAGNOSIS — E83.119 HEMOCHROMATOSIS, UNSPECIFIED HEMOCHROMATOSIS TYPE: ICD-10-CM

## 2025-02-10 PROCEDURE — 99214 OFFICE O/P EST MOD 30 MIN: CPT | Performed by: INTERNAL MEDICINE

## 2025-02-15 ENCOUNTER — OFFICE VISIT (OUTPATIENT)
Dept: URGENT CARE | Facility: CLINIC | Age: 40
End: 2025-02-15
Payer: COMMERCIAL

## 2025-02-15 VITALS
SYSTOLIC BLOOD PRESSURE: 130 MMHG | HEART RATE: 114 BPM | DIASTOLIC BLOOD PRESSURE: 78 MMHG | RESPIRATION RATE: 16 BRPM | OXYGEN SATURATION: 96 % | TEMPERATURE: 97.5 F

## 2025-02-15 DIAGNOSIS — L03.311 ABDOMINAL WALL CELLULITIS: Primary | ICD-10-CM

## 2025-02-15 PROCEDURE — 99213 OFFICE O/P EST LOW 20 MIN: CPT | Performed by: NURSE PRACTITIONER

## 2025-02-15 NOTE — PROGRESS NOTES
[facility]        NAME: jR Giraldo is a 39 y.o. male  : 1985    MRN: [id number]  DATE: February 15, 2025  TIME: 12:10 PM    Assessment and Plan   Abdominal wall cellulitis [L03.311]  1. Abdominal wall cellulitis  amoxicillin-clavulanate (AUGMENTIN) 875-125 mg per tablet            Patient Instructions   Augmentin twice a day for 10 days  Cool compresses  Monitor for worsening symptoms, if these occur- proceed to the ER  Tylenol as needed    Follow up with PCP in 3-5 days.  Proceed to  ER if symptoms worsen.    Chief Complaint     Chief Complaint   Patient presents with    Mass     Rt side lower abdomen x 4 days redness,painfull         History of Present Illness       Patient is a 39-year-old male presenting with right lower quadrant abdominal redness, firmness, and warmth.  He states that on Tuesday night he injected hCG in this area.  On Wednesday the area was red and has progressively become more firm.  Denies fevers or chills.  No over-the-counter medications attempted.        Review of Systems   Review of Systems   Constitutional:  Negative for activity change, chills and fever.   Skin:  Positive for color change.   Neurological:  Negative for headaches.         Current Medications       Current Outpatient Medications:     amoxicillin-clavulanate (AUGMENTIN) 875-125 mg per tablet, Take 1 tablet by mouth every 12 (twelve) hours for 10 days, Disp: 20 tablet, Rfl: 0    pantoprazole (PROTONIX) 40 mg tablet, TAKE 1 TABLET BY MOUTH EVERY DAY, Disp: 90 tablet, Rfl: 1    clotrimazole (LOTRIMIN) 1 % cream, Apply topically 2 (two) times a day (Patient not taking: Reported on 7/10/2022), Disp: 30 g, Rfl: 0    Current Allergies     Allergies as of 02/15/2025    (No Known Allergies)            The following portions of the patient's history were reviewed and updated as appropriate: allergies, current medications, past family history, past medical history, past social history, past surgical history and  problem list.     Past Medical History:   Diagnosis Date    GERD (gastroesophageal reflux disease)        Past Surgical History:   Procedure Laterality Date    HERNIA REPAIR  1985       Family History   Problem Relation Age of Onset    Alcohol abuse Neg Hx     Substance Abuse Neg Hx     Mental illness Neg Hx     Depression Neg Hx          Medications have been verified.        Objective   /78   Pulse (!) 114   Temp 97.5 °F (36.4 °C)   Resp 16   SpO2 96%        Physical Exam     Physical Exam  Vitals reviewed.   Constitutional:       General: He is awake. He is not in acute distress.     Appearance: Normal appearance. He is normal weight.   HENT:      Head: Normocephalic.   Cardiovascular:      Rate and Rhythm: Tachycardia present.   Pulmonary:      Effort: Pulmonary effort is normal.   Abdominal:       Neurological:      General: No focal deficit present.      Mental Status: He is alert and oriented to person, place, and time.   Psychiatric:         Behavior: Behavior is cooperative.

## 2025-02-15 NOTE — PATIENT INSTRUCTIONS
Augmentin twice a day for 10 days  Cool compresses  Monitor for worsening symptoms, if these occur- proceed to the ER  Tylenol as needed    Patient Education     Cellulitis and erysipelas (skin infections)   The Basics   Written by the doctors and editors at [facility]   What are cellulitis and erysipelas? -- Cellulitis and erysipelas are both infections of the skin. These infections can cause redness, pain, and swelling. The difference between them is that erysipelas tends to affect the upper layers of skin, and cellulitis tends to affect deep layers of skin and sometimes the fat under the skin.  Cellulitis and erysipelas can happen when germs get into the skin. Normally, different types of germs live on a person's skin. Most of the time, these germs do not cause any problems. But if a person gets a cut or a break in the skin, the germs can get into the skin and cause an infection.  Certain conditions can increase a person's chance of getting cellulitis or erysipelas. These include:   Having a cut (even a tiny one)   Having another type of skin infection or a long-term skin condition   Having swelling of the skin or swelling in the body   Being overweight  What are the symptoms of cellulitis and erysipelas? -- Both types of infection cause very similar symptoms. Either infection can cause the infected area to be painful, red, swollen, or warm. Some people with cellulitis or erysipelas can sometimes also have fever or chills. And sometimes, people with these infections have no symptoms or only some of these symptoms.  Most of the time, cellulitis and erysipelas happen on the legs or arms. But people can get these infections in other places, such as the belly, face, in the mouth, or around the anus.  Will I need tests? -- Most people do not need any tests. Your doctor or nurse will do an exam and look at your skin.  It's important for a doctor or nurse to do an exam to figure out what kind of infection you have. The  "right treatment for a skin infection depends on the type of infection it is and which germs are causing it. Your doctor or nurse might need to do tests to figure out the cause of your infection.  If you have cellulitis or erysipelas, it's important to get treated. These infections can spread to the whole body and become serious if not treated.  How are cellulitis and erysipelas treated? -- These infections are treated with antibiotic pills. If your doctor prescribes medicine for you to take at home, it is important to follow the directions exactly. Take all of the pills you are given, even if you feel better before you finish them. If you do not take all the pills, the infection can come back worse.  People who have severe infections might be treated in the hospital and given antibiotics through a thin tube that goes into a vein, called an \"IV\".  What can I do to help treat my infection? -- You can:   Raise your arm or leg (if your infection is on your arm or leg) to reduce swelling - Raise the arm or leg up above the level of your heart 3 or 4 times a day, for 30 minutes each time.   Keep the infected area clean and dry - You can take a shower or bath, but be sure to pat the area dry with a towel afterward.  Antibiotic ointments or creams do not work to treat cellulitis and erysipelas.  Should I call my doctor or nurse? -- You should call your doctor or nurse if your symptoms do not get better within 3 days of starting treatment. You should also call if the affected area gets:   Bigger   More swollen   More painful  Your doctor or nurse might do another exam or tests to see if you need different medicines.  Can skin infections be prevented? -- Sometimes. If you cut your skin, make sure to wash the area well with soap and water. This can help prevent the area from getting infected. If you have a long-term skin condition, ask your doctor or nurse what you can do to help prevent infections.  All topics are updated as " new evidence becomes available and our peer review process is complete.  This topic retrieved from [employer] on: Feb 26, 2024.  Topic 17205 Version 15.0  Release: 32.2.4 - C32.56  © 2024 UpToDate, Inc. and/or its affiliates. All rights reserved.  Consumer Information Use and Disclaimer   Disclaimer: This generalized information is a limited summary of diagnosis, treatment, and/or medication information. It is not meant to be comprehensive and should be used as a tool to help the user understand and/or assess potential diagnostic and treatment options. It does NOT include all information about conditions, treatments, medications, side effects, or risks that may apply to a specific patient. It is not intended to be medical advice or a substitute for the medical advice, diagnosis, or treatment of a health care provider based on the health care provider's examination and assessment of a patient's specific and unique circumstances. Patients must speak with a health care provider for complete information about their health, medical questions, and treatment options, including any risks or benefits regarding use of medications. This information does not endorse any treatments or medications as safe, effective, or approved for treating a specific patient. UpToDate, Inc. and its affiliates disclaim any warranty or liability relating to this information or the use thereof.The use of this information is governed by the Terms of Use, available at https://www.woltersSoundsupplyuwer.com/en/know/clinical-effectiveness-terms. 2024© UpToDate, Inc. and its affiliates and/or licensors. All rights reserved.  Copyright   © 2024 UpToDate, Inc. and/or its affiliates. All rights reserved.

## 2025-04-07 ENCOUNTER — TELEPHONE (OUTPATIENT)
Dept: HEMATOLOGY ONCOLOGY | Facility: CLINIC | Age: 40
End: 2025-04-07

## 2025-04-09 DIAGNOSIS — K29.60 REFLUX GASTRITIS: ICD-10-CM

## 2025-04-10 RX ORDER — PANTOPRAZOLE SODIUM 40 MG/1
40 TABLET, DELAYED RELEASE ORAL DAILY
Qty: 30 TABLET | Refills: 0 | Status: SHIPPED | OUTPATIENT
Start: 2025-04-10

## 2025-04-18 ENCOUNTER — TELEPHONE (OUTPATIENT)
Age: 40
End: 2025-04-18

## 2025-04-18 NOTE — TELEPHONE ENCOUNTER
Spoke to Rj regarding having lab work completed for upcoming appointment. Patient very appreciative of the reminder call.

## 2025-05-02 ENCOUNTER — TELEPHONE (OUTPATIENT)
Age: 40
End: 2025-05-02

## 2025-05-02 NOTE — TELEPHONE ENCOUNTER
Spoke to Rj regarding having lab work completed for upcoming appointment. Patient stated he will be going to the lab tomorrow.

## 2025-05-03 ENCOUNTER — APPOINTMENT (OUTPATIENT)
Dept: LAB | Facility: CLINIC | Age: 40
End: 2025-05-03
Payer: COMMERCIAL

## 2025-05-03 DIAGNOSIS — D75.1 ERYTHROCYTOSIS: ICD-10-CM

## 2025-05-03 LAB
BASOPHILS # BLD AUTO: 0.03 THOUSANDS/ÂΜL (ref 0–0.1)
BASOPHILS NFR BLD AUTO: 1 % (ref 0–1)
EOSINOPHIL # BLD AUTO: 0.08 THOUSAND/ÂΜL (ref 0–0.61)
EOSINOPHIL NFR BLD AUTO: 1 % (ref 0–6)
ERYTHROCYTE [DISTWIDTH] IN BLOOD BY AUTOMATED COUNT: 12.9 % (ref 11.6–15.1)
FERRITIN SERPL-MCNC: 32 NG/ML (ref 30–336)
HCT VFR BLD AUTO: 50.4 % (ref 36.5–49.3)
HGB BLD-MCNC: 17.7 G/DL (ref 12–17)
IMM GRANULOCYTES # BLD AUTO: 0.02 THOUSAND/UL (ref 0–0.2)
IMM GRANULOCYTES NFR BLD AUTO: 0 % (ref 0–2)
IRON SATN MFR SERPL: 31 % (ref 15–50)
IRON SERPL-MCNC: 105 UG/DL (ref 50–212)
LYMPHOCYTES # BLD AUTO: 1.67 THOUSANDS/ÂΜL (ref 0.6–4.47)
LYMPHOCYTES NFR BLD AUTO: 30 % (ref 14–44)
MCH RBC QN AUTO: 30.7 PG (ref 26.8–34.3)
MCHC RBC AUTO-ENTMCNC: 35.1 G/DL (ref 31.4–37.4)
MCV RBC AUTO: 87 FL (ref 82–98)
MONOCYTES # BLD AUTO: 0.69 THOUSAND/ÂΜL (ref 0.17–1.22)
MONOCYTES NFR BLD AUTO: 12 % (ref 4–12)
NEUTROPHILS # BLD AUTO: 3.15 THOUSANDS/ÂΜL (ref 1.85–7.62)
NEUTS SEG NFR BLD AUTO: 56 % (ref 43–75)
NRBC BLD AUTO-RTO: 0 /100 WBCS
PLATELET # BLD AUTO: 211 THOUSANDS/UL (ref 149–390)
PMV BLD AUTO: 8.4 FL (ref 8.9–12.7)
RBC # BLD AUTO: 5.77 MILLION/UL (ref 3.88–5.62)
RETICS # AUTO: NORMAL 10*3/UL (ref 14356–105094)
RETICS # CALC: 1.59 % (ref 0.37–1.87)
TIBC SERPL-MCNC: 343 UG/DL (ref 250–450)
TRANSFERRIN SERPL-MCNC: 245 MG/DL (ref 203–362)
UIBC SERPL-MCNC: 238 UG/DL (ref 155–355)
WBC # BLD AUTO: 5.64 THOUSAND/UL (ref 4.31–10.16)

## 2025-05-03 PROCEDURE — 81339 MPL GENE SEQ ALYS EXON 10: CPT

## 2025-05-03 PROCEDURE — 83540 ASSAY OF IRON: CPT

## 2025-05-03 PROCEDURE — 83550 IRON BINDING TEST: CPT

## 2025-05-03 PROCEDURE — 85045 AUTOMATED RETICULOCYTE COUNT: CPT

## 2025-05-03 PROCEDURE — 81270 JAK2 GENE: CPT

## 2025-05-03 PROCEDURE — 81279 JAK2 GENE TRGT SEQUENCE ALYS: CPT

## 2025-05-03 PROCEDURE — 85025 COMPLETE CBC W/AUTO DIFF WBC: CPT

## 2025-05-03 PROCEDURE — 82728 ASSAY OF FERRITIN: CPT

## 2025-05-03 PROCEDURE — 36415 COLL VENOUS BLD VENIPUNCTURE: CPT

## 2025-05-03 PROCEDURE — 81219 CALR GENE COM VARIANTS: CPT

## 2025-05-03 NOTE — PROGRESS NOTES
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      2/10/2025     Repeat labs post iron in 2024 with WBC 5.5 Hgb 18.1 MCV 88 plts 316     Last Hgb 2024 was 17.9     Is having sleep study; this appears consistent secondary      However, will do FARSHAD testing-was not run last visit        2025   Hgb = 17.7, hct = 50.4, retic = 91,700  FARSHAD 2 =  pending     Iron panel total iron 105 ferritin 32 % sat 31 TIBC 343     Doing well, no issues    Farshad testing as above done yesterday-will call with results   Hemochromatosis, unspecified hemochromatosis type  No evidence hemochromatosis   Ferritin = 32       Iron deficiency anemia due to chronic blood loss  Hgb 2024 17.9 MCV 82 WBC 7.3 plts 249     Weight remains 222 with BMI 33 which may be contributing     Also has not had phlebotomy since completing iron about one month ago     Will test for FARSHAD/MPL/CALR as well as reticulocyte     Epo was normal 7.8 thus less likely primary      Feels more energetic     2/10/2025     Total iron post Venofer completed 2024 was 81 with ferritin 77 and % sast 26     Hgb 18.1 MCV 88    2025  Completed Venofer 2024  Hgb = 17.7 hct = 50.4 plt = 211  mcv = 87 ferritin = 32       Summary/Recommendations     Continue follow iron panel every  6 months      Continue follow counts every 6 months pending the FARSHAD testing below      FARSHAD testing pending-will call with results    Presume secondary  "erythrocytosis/sleep apnea    Repeat labs in 6 months with telemedicine follow up        History of Present Illness   No chief complaint on file.      HPI: 39/M sent from gastroenterology office due to self reported history of hemochromatosis. Patient with otherwise no significant past medical history except GERD. He mentions that he works out in the gym a lot and likes to build muscles and one of his gym buddies referred him to this ?doctor outside [place] appx 2 years ago at a clinic named \"[facility] to get a full check up and get a full blood test panel ordered to get better idea of his health and more insights into muscle building. Patient mentions that he went there and got a lot of blood tests and the physician called him next day saying he needs to go see him asap because his blood is very thick and he can have a stroke at any time. He was advised to start donating blood to make blood thinner and was told he has hemochromatosis. Patient starting to donate blood every 8 weeks since then and mentions doing that for atleast more than a year.     Patient did have those labs from 2 years ago on his phone which were reviewed today and showed ferritin level in 180s, hemoglobin 19.6, and RBC count 6.60. Otherwise Vitamin levels, cortisol, TSH, CMP, homocysteine was all within normal limits.      Patient recently presented to the gastroenterology office for symptoms of GERD and mentioned about possibility of hemochromatosis there and hence was referred here. He underwent EGD which showed small hiatal hernia and signs of healed ulceration at the GE junction and he was started on protonix for the same. Patient does not mention any known history of hemochromatosis in family. No current issues with fever, chills, nausea, vomiting, abdominal pain, weight loss, weight gain, skin discoloration, night sweats, visions issues.        Interval History: 12/9/2024     Doing well; feels improved with recent iron " infusions.  Hgb 17.9 and has not donated blood.  Last Hgb 14.9 in August 2024.  WBC 7.3 plts 249, no reticu EPO 7.8 thus normal.  Iron stores repleted as above.       Na 138 K 4.8 Cr 1.29 Ca 9.2 ALT/AST 34/21      May have secondary erythocytosis as suspected due to elevated BMI     No breathing issues, no LAU/SOB        Interval History 2/20/2025     Doing well; no issues.  Feels less tired.  Suspect secondary erythrocytosis based on sleep apnea as well as BMI     Will have sleep study in the coming months; was scheduled but cancelled due to conflict     Interval History 5/6/2025    Consultation with sleep medicine is today but unclear if kept appointment     Doing well otherwise, no active issues     Pertinent Medical History     05/03/25:      Review of Systems   All other systems reviewed and are negative.          Objective   There were no vitals taken for this visit.    Pain Screening:     ECOG   0  Physical Exam  no exam telemedicine     Labs: I have reviewed the following labs:  Lab Results   Component Value Date/Time    WBC 5.64 05/03/2025 09:59 AM    RBC 5.77 (H) 05/03/2025 09:59 AM    Hemoglobin 17.7 (H) 05/03/2025 09:59 AM    Hematocrit 50.4 (H) 05/03/2025 09:59 AM    MCV 87 05/03/2025 09:59 AM    MCH 30.7 05/03/2025 09:59 AM    RDW 12.9 05/03/2025 09:59 AM    Platelets 211 05/03/2025 09:59 AM    Segmented % 56 05/03/2025 09:59 AM    Lymphocytes % 30 05/03/2025 09:59 AM    Monocytes % 12 05/03/2025 09:59 AM    Eosinophils Relative 1 05/03/2025 09:59 AM    Basophils Relative 1 05/03/2025 09:59 AM    Immature Grans % 0 05/03/2025 09:59 AM    Absolute Neutrophils 3.15 05/03/2025 09:59 AM     Lab Results   Component Value Date/Time    Potassium 4.5 02/07/2025 07:24 AM    Chloride 103 02/07/2025 07:24 AM    CO2 26 02/07/2025 07:24 AM    BUN 13 02/07/2025 07:24 AM    Creatinine 1.28 02/07/2025 07:24 AM    Glucose, Fasting 86 02/07/2025 07:24 AM    Calcium 9.4 02/07/2025 07:24 AM    AST 24 02/07/2025  07:24 AM    ALT 35 02/07/2025 07:24 AM    Alkaline Phosphatase 49 02/07/2025 07:24 AM    Total Protein 6.9 02/07/2025 07:24 AM    Albumin 4.4 02/07/2025 07:24 AM    Total Bilirubin 0.42 02/07/2025 07:24 AM    eGFR 69 02/07/2025 07:24 AM           Administrative Statements   Encounter provider Anna Thomas MD    The Patient is located at Other and in the following state in which I hold an active license [place].    The patient was identified by name and date of birth. Rj Giraldo was informed that this is a telemedicine visit and that the visit is being conducted through the Epic Embedded platform. He agrees to proceed..  My office door was closed. No one else was in the room.  He acknowledged consent and understanding of privacy and security of the video platform. The patient has agreed to participate and understands they can discontinue the visit at any time.    I have spent a total time of 30 minutes in caring for this patient on the day of the visit/encounter including Impressions, Counseling / Coordination of care, Documenting in the medical record, Reviewing/placing orders in the medical record (including tests, medications, and/or procedures), and Obtaining or reviewing history  , not including the time spent for establishing the audio/video connection.

## 2025-05-06 ENCOUNTER — TELEMEDICINE (OUTPATIENT)
Age: 40
End: 2025-05-06
Payer: COMMERCIAL

## 2025-05-06 ENCOUNTER — TELEPHONE (OUTPATIENT)
Dept: SLEEP CENTER | Facility: CLINIC | Age: 40
End: 2025-05-06

## 2025-05-06 ENCOUNTER — TELEPHONE (OUTPATIENT)
Age: 40
End: 2025-05-06

## 2025-05-06 DIAGNOSIS — E83.119 HEMOCHROMATOSIS, UNSPECIFIED HEMOCHROMATOSIS TYPE: ICD-10-CM

## 2025-05-06 DIAGNOSIS — D75.1 ERYTHROCYTOSIS: Primary | ICD-10-CM

## 2025-05-06 DIAGNOSIS — D50.0 IRON DEFICIENCY ANEMIA DUE TO CHRONIC BLOOD LOSS: ICD-10-CM

## 2025-05-06 PROCEDURE — 99214 OFFICE O/P EST MOD 30 MIN: CPT | Performed by: INTERNAL MEDICINE

## 2025-05-06 NOTE — ASSESSMENT & PLAN NOTE
Hgb 11/30/2024 17.9 MCV 82 WBC 7.3 plts 249     Weight remains 222 with BMI 33 which may be contributing     Also has not had phlebotomy since completing iron about one month ago     Will test for ROBBIE/MPL/CALR as well as reticulocyte     Epo was normal 7.8 thus less likely primary      Feels more energetic     2/10/2025     Total iron post Venofer completed Nov 2024 was 81 with ferritin 77 and % sast 26     Hgb 18.1 MCV 88    5/6/2025  Completed Venofer 11/4/2024  Hgb = 17.7 hct = 50.4 plt = 211  mcv = 87 ferritin = 32

## 2025-05-06 NOTE — TELEPHONE ENCOUNTER
Patient No showed appt on 5/6. Called patient and he stated that he canceled the appt for today via text message. Patient is rescheduled for 9/16 in [place], Added to wait list for sooner appt.

## 2025-05-06 NOTE — TELEPHONE ENCOUNTER
Called and scheduled 6 MO FU TELEMED with patient for 11/5 at 8 am.  Patient declined paperwork, does JED

## 2025-05-06 NOTE — PATIENT INSTRUCTIONS
Labs in 6 months    Follow up 6 months    Will call with mutation testing results in about 10-12 days

## 2025-05-08 ENCOUNTER — OFFICE VISIT (OUTPATIENT)
Dept: INTERNAL MEDICINE CLINIC | Facility: CLINIC | Age: 40
End: 2025-05-08
Payer: COMMERCIAL

## 2025-05-08 VITALS
DIASTOLIC BLOOD PRESSURE: 82 MMHG | SYSTOLIC BLOOD PRESSURE: 120 MMHG | HEART RATE: 96 BPM | BODY MASS INDEX: 32.58 KG/M2 | TEMPERATURE: 97.1 F | WEIGHT: 220 LBS | OXYGEN SATURATION: 97 % | HEIGHT: 69 IN

## 2025-05-08 DIAGNOSIS — D50.0 IRON DEFICIENCY ANEMIA DUE TO CHRONIC BLOOD LOSS: ICD-10-CM

## 2025-05-08 DIAGNOSIS — Z00.00 ANNUAL PHYSICAL EXAM: Primary | ICD-10-CM

## 2025-05-08 DIAGNOSIS — K29.60 REFLUX GASTRITIS: ICD-10-CM

## 2025-05-08 PROCEDURE — 99396 PREV VISIT EST AGE 40-64: CPT | Performed by: NURSE PRACTITIONER

## 2025-05-08 RX ORDER — PANTOPRAZOLE SODIUM 40 MG/1
40 TABLET, DELAYED RELEASE ORAL DAILY
Qty: 90 TABLET | Refills: 1 | Status: SHIPPED | OUTPATIENT
Start: 2025-05-08

## 2025-05-08 NOTE — PROGRESS NOTES
Adult Annual Physical  Name: Rj Giraldo      : 1985      MRN: [id number]  Encounter Provider: BENI Paz  Encounter Date: 2025   Encounter department: [facility] INTERNAL MEDICINE    :  Assessment & Plan  Annual physical exam    Orders:    Comprehensive metabolic panel; Future    CBC and differential; Future    Lipid Panel with Direct LDL reflex; Future    TSH, 3rd generation with Free T4 reflex; Future    Reflux gastritis  Stable.  On a PPI daily.  EGD done last year.     Orders:    pantoprazole (PROTONIX) 40 mg tablet; Take 1 tablet (40 mg total) by mouth daily    Iron deficiency anemia due to chronic blood loss  CBC stable.  Sees hematology.                Preventive Screenings:  - Diabetes Screening: screening up-to-date  - Cholesterol Screening: orders placed   - Colon cancer screening: screening not indicated   - Lung cancer screening: screening not indicated   - Prostate cancer screening: screening not indicated          History of Present Illness     Adult Annual Physical:  Patient presents for annual physical.     Diet and Physical Activity:  - Diet/Nutrition: no special diet.  - Exercise: strength training exercises and 1-2 times a week on average.    Depression Screening:  - PHQ-2 Score: 0    General Health:  - Sleep: sleeps poorly.  - Hearing: normal hearing bilateral ears.  - Vision: wears glasses and most recent eye exam < 1 year ago.  - Dental: regular dental visits.     Health:  - History of STDs: no.   - Urinary symptoms: none.     Advanced Care Planning:  - Has an advanced directive?: no    - Has a durable medical POA?: yes      Review of Systems   Constitutional:  Negative for activity change, appetite change, fatigue and unexpected weight change.   Eyes:  Negative for visual disturbance.   Respiratory:  Negative for cough and shortness of breath.    Cardiovascular:  Negative for chest pain, palpitations and leg swelling.   Gastrointestinal:  Negative for  "abdominal pain, blood in stool, constipation and diarrhea.   Genitourinary:  Negative for difficulty urinating.   Musculoskeletal:  Negative for arthralgias.   Skin:  Negative for rash.   Neurological:  Negative for dizziness, light-headedness and headaches.   Psychiatric/Behavioral:  Negative for sleep disturbance.          Objective   /82   Pulse 96   Temp (!) 97.1 °F (36.2 °C)   Ht 5' 9\" (1.753 m)   Wt 99.8 kg (220 lb)   SpO2 97%   BMI 32.49 kg/m²     Physical Exam  Vitals reviewed.   Constitutional:       Appearance: Normal appearance. He is well-developed.   HENT:      Head: Normocephalic and atraumatic.      Right Ear: Tympanic membrane, ear canal and external ear normal.      Left Ear: Tympanic membrane, ear canal and external ear normal.   Eyes:      Conjunctiva/sclera: Conjunctivae normal.   Cardiovascular:      Rate and Rhythm: Normal rate and regular rhythm.      Heart sounds: Normal heart sounds.   Pulmonary:      Effort: Pulmonary effort is normal.      Breath sounds: Normal breath sounds.   Abdominal:      General: Bowel sounds are normal.      Palpations: Abdomen is soft.   Musculoskeletal:         General: Normal range of motion.      Cervical back: Neck supple.      Right lower leg: No edema.      Left lower leg: No edema.   Skin:     General: Skin is warm and dry.   Neurological:      Mental Status: He is alert and oriented to person, place, and time.   Psychiatric:         Mood and Affect: Mood normal.         Behavior: Behavior normal.         "

## 2025-05-08 NOTE — ASSESSMENT & PLAN NOTE
Stable.  On a PPI daily.  EGD done last year.     Orders:    pantoprazole (PROTONIX) 40 mg tablet; Take 1 tablet (40 mg total) by mouth daily

## 2025-05-19 LAB — SCAN RESULT: NORMAL

## 2025-08-18 DIAGNOSIS — K29.60 REFLUX GASTRITIS: ICD-10-CM

## 2025-08-20 RX ORDER — PANTOPRAZOLE SODIUM 40 MG/1
40 TABLET, DELAYED RELEASE ORAL DAILY
Qty: 90 TABLET | Refills: 1 | Status: SHIPPED | OUTPATIENT
Start: 2025-08-20